# Patient Record
Sex: FEMALE | Race: WHITE
[De-identification: names, ages, dates, MRNs, and addresses within clinical notes are randomized per-mention and may not be internally consistent; named-entity substitution may affect disease eponyms.]

---

## 2019-01-23 ENCOUNTER — HOSPITAL ENCOUNTER (INPATIENT)
Dept: HOSPITAL 84 - D.ER | Age: 74
LOS: 2 days | Discharge: NURSING, REHAB OR LONG-TERM CARE | DRG: 638 | End: 2019-01-25
Attending: FAMILY MEDICINE | Admitting: FAMILY MEDICINE
Payer: MEDICARE

## 2019-01-23 VITALS
WEIGHT: 190 LBS | BODY MASS INDEX: 28.14 KG/M2 | WEIGHT: 190 LBS | HEIGHT: 69 IN | BODY MASS INDEX: 28.14 KG/M2 | HEIGHT: 69 IN | BODY MASS INDEX: 28.14 KG/M2

## 2019-01-23 DIAGNOSIS — F41.9: ICD-10-CM

## 2019-01-23 DIAGNOSIS — E11.65: Primary | ICD-10-CM
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
## 2019-01-23 DIAGNOSIS — M17.0: ICD-10-CM

## 2019-01-23 DIAGNOSIS — I10: ICD-10-CM

## 2019-01-23 DIAGNOSIS — N39.0: ICD-10-CM

## 2019-01-23 LAB
ALBUMIN SERPL-MCNC: 3.1 G/DL (ref 3.4–5)
ALP SERPL-CCNC: 93 U/L (ref 46–116)
ALT SERPL-CCNC: 41 U/L (ref 10–68)
ANION GAP SERPL CALC-SCNC: 15 MMOL/L (ref 8–16)
APPEARANCE UR: CLEAR
BACTERIA #/AREA URNS HPF: (no result) /HPF
BASOPHILS NFR BLD AUTO: 0.3 % (ref 0–2)
BILIRUB SERPL-MCNC: 0.35 MG/DL (ref 0.2–1.3)
BILIRUB SERPL-MCNC: NEGATIVE MG/DL
BUN SERPL-MCNC: 29 MG/DL (ref 7–18)
CALCIUM SERPL-MCNC: 8.8 MG/DL (ref 8.5–10.1)
CHLORIDE SERPL-SCNC: 97 MMOL/L (ref 98–107)
CO2 SERPL-SCNC: 23.9 MMOL/L (ref 21–32)
COLOR UR: YELLOW
CREAT SERPL-MCNC: 0.8 MG/DL (ref 0.6–1.3)
EOSINOPHIL NFR BLD: 0.3 % (ref 0–7)
ERYTHROCYTE [DISTWIDTH] IN BLOOD BY AUTOMATED COUNT: 12.4 % (ref 11.5–14.5)
GLOBULIN SER-MCNC: 3.3 G/L
GLUCOSE SERPL-MCNC: 1000 MG/DL
GLUCOSE SERPL-MCNC: 351 MG/DL (ref 74–106)
HCT VFR BLD CALC: 45.2 % (ref 36–48)
HGB BLD-MCNC: 15.7 G/DL (ref 12–16)
IMM GRANULOCYTES NFR BLD: 0.3 % (ref 0–5)
KETONES UR STRIP-MCNC: NEGATIVE MG/DL
LYMPHOCYTES NFR BLD AUTO: 12.6 % (ref 15–50)
MCH RBC QN AUTO: 30.3 PG (ref 26–34)
MCHC RBC AUTO-ENTMCNC: 34.7 G/DL (ref 31–37)
MCV RBC: 87.1 FL (ref 80–100)
MONOCYTES NFR BLD: 7.2 % (ref 2–11)
NEUTROPHILS NFR BLD AUTO: 79.3 % (ref 40–80)
NITRITE UR-MCNC: NEGATIVE MG/ML
OSMOLALITY SERPL CALC.SUM OF ELEC: 282 MOSM/KG (ref 275–300)
PH UR STRIP: 5 [PH] (ref 5–6)
PLATELET # BLD: 250 10X3/UL (ref 130–400)
PMV BLD AUTO: 10.2 FL (ref 7.4–10.4)
POTASSIUM SERPL-SCNC: 4.9 MMOL/L (ref 3.5–5.1)
PROT SERPL-MCNC: 6.4 G/DL (ref 6.4–8.2)
PROT UR-MCNC: (no result) MG/DL
RBC # BLD AUTO: 5.19 10X6/UL (ref 4–5.4)
RBC #/AREA URNS HPF: (no result) /HPF (ref 0–5)
SODIUM SERPL-SCNC: 131 MMOL/L (ref 136–145)
SP GR UR STRIP: 1.01 (ref 1–1.02)
SQUAMOUS #/AREA URNS HPF: (no result) /HPF (ref 0–5)
UROBILINOGEN UR-MCNC: NORMAL MG/DL
WBC # BLD AUTO: 15 10X3/UL (ref 4.8–10.8)

## 2019-01-23 NOTE — NUR
PATIENT RECEIVED FROM ED.  BED IN LOW POSITION WITH SIDERAILS X1 AND CALL
LIGHT WITHIN REACH.  THE PATIENT WAS EDUCATE DON THE USE OF A CALL LIGHT AND
DEMONSTRATED UNDERSTANDING VIA TEACHBACK METHOD.  THE PATIENT APPEARS
COMFORTABLE WITH NO QUESTIONS OR CONCERNS AT THIS TIME.

## 2019-01-23 NOTE — NUR
REPORT CALLED TO YUE COLEMAN ON MED 3. PATIENT TRANSFERED TO ROOM 1212 VIA
SHEELCHAIR IN STABLE CONDITION.

## 2019-01-24 VITALS — DIASTOLIC BLOOD PRESSURE: 81 MMHG | SYSTOLIC BLOOD PRESSURE: 144 MMHG

## 2019-01-24 VITALS — DIASTOLIC BLOOD PRESSURE: 91 MMHG | SYSTOLIC BLOOD PRESSURE: 181 MMHG

## 2019-01-24 VITALS — DIASTOLIC BLOOD PRESSURE: 88 MMHG | SYSTOLIC BLOOD PRESSURE: 187 MMHG

## 2019-01-24 VITALS — DIASTOLIC BLOOD PRESSURE: 85 MMHG | SYSTOLIC BLOOD PRESSURE: 178 MMHG

## 2019-01-24 VITALS — SYSTOLIC BLOOD PRESSURE: 161 MMHG | DIASTOLIC BLOOD PRESSURE: 102 MMHG

## 2019-01-24 VITALS — SYSTOLIC BLOOD PRESSURE: 176 MMHG | DIASTOLIC BLOOD PRESSURE: 111 MMHG

## 2019-01-24 LAB
ANION GAP SERPL CALC-SCNC: 13.3 MMOL/L (ref 8–16)
BASOPHILS NFR BLD AUTO: 0.2 % (ref 0–2)
BUN SERPL-MCNC: 22 MG/DL (ref 7–18)
CALCIUM SERPL-MCNC: 8.6 MG/DL (ref 8.5–10.1)
CHLORIDE SERPL-SCNC: 100 MMOL/L (ref 98–107)
CO2 SERPL-SCNC: 28.3 MMOL/L (ref 21–32)
CREAT SERPL-MCNC: 0.7 MG/DL (ref 0.6–1.3)
EOSINOPHIL NFR BLD: 0.5 % (ref 0–7)
ERYTHROCYTE [DISTWIDTH] IN BLOOD BY AUTOMATED COUNT: 12.4 % (ref 11.5–14.5)
GLUCOSE SERPL-MCNC: 186 MG/DL (ref 74–106)
HCT VFR BLD CALC: 43.4 % (ref 36–48)
HGB BLD-MCNC: 14.7 G/DL (ref 12–16)
IMM GRANULOCYTES NFR BLD: 0.2 % (ref 0–5)
LYMPHOCYTES NFR BLD AUTO: 17.3 % (ref 15–50)
MAGNESIUM SERPL-MCNC: 2 MG/DL (ref 1.8–2.4)
MCH RBC QN AUTO: 29.8 PG (ref 26–34)
MCHC RBC AUTO-ENTMCNC: 33.9 G/DL (ref 31–37)
MCV RBC: 87.9 FL (ref 80–100)
MONOCYTES NFR BLD: 7.4 % (ref 2–11)
NEUTROPHILS NFR BLD AUTO: 74.4 % (ref 40–80)
OSMOLALITY SERPL CALC.SUM OF ELEC: 281 MOSM/KG (ref 275–300)
PHOSPHATE SERPL-MCNC: 3.8 MG/DL (ref 2.5–4.9)
PLATELET # BLD: 239 10X3/UL (ref 130–400)
PMV BLD AUTO: 10 FL (ref 7.4–10.4)
POTASSIUM SERPL-SCNC: 4.6 MMOL/L (ref 3.5–5.1)
RBC # BLD AUTO: 4.94 10X6/UL (ref 4–5.4)
SODIUM SERPL-SCNC: 137 MMOL/L (ref 136–145)
WBC # BLD AUTO: 13 10X3/UL (ref 4.8–10.8)

## 2019-01-25 ENCOUNTER — HOSPITAL ENCOUNTER (INPATIENT)
Dept: HOSPITAL 84 - D.REHAB | Age: 74
LOS: 8 days | Discharge: HOME HEALTH | DRG: 948 | End: 2019-02-02
Attending: FAMILY MEDICINE | Admitting: FAMILY MEDICINE
Payer: MEDICARE

## 2019-01-25 VITALS
BODY MASS INDEX: 28.44 KG/M2 | BODY MASS INDEX: 28.44 KG/M2 | BODY MASS INDEX: 28.44 KG/M2 | HEIGHT: 69 IN | WEIGHT: 192 LBS | WEIGHT: 192 LBS | HEIGHT: 69 IN

## 2019-01-25 VITALS — SYSTOLIC BLOOD PRESSURE: 161 MMHG | DIASTOLIC BLOOD PRESSURE: 96 MMHG

## 2019-01-25 VITALS — DIASTOLIC BLOOD PRESSURE: 97 MMHG | SYSTOLIC BLOOD PRESSURE: 178 MMHG

## 2019-01-25 VITALS — DIASTOLIC BLOOD PRESSURE: 72 MMHG | SYSTOLIC BLOOD PRESSURE: 167 MMHG

## 2019-01-25 VITALS — SYSTOLIC BLOOD PRESSURE: 178 MMHG | DIASTOLIC BLOOD PRESSURE: 97 MMHG

## 2019-01-25 VITALS — DIASTOLIC BLOOD PRESSURE: 98 MMHG | SYSTOLIC BLOOD PRESSURE: 195 MMHG

## 2019-01-25 VITALS — SYSTOLIC BLOOD PRESSURE: 176 MMHG | DIASTOLIC BLOOD PRESSURE: 81 MMHG

## 2019-01-25 DIAGNOSIS — R52: ICD-10-CM

## 2019-01-25 DIAGNOSIS — N39.0: ICD-10-CM

## 2019-01-25 DIAGNOSIS — E11.40: ICD-10-CM

## 2019-01-25 DIAGNOSIS — R53.1: ICD-10-CM

## 2019-01-25 DIAGNOSIS — E11.65: ICD-10-CM

## 2019-01-25 DIAGNOSIS — R41.0: ICD-10-CM

## 2019-01-25 DIAGNOSIS — F41.9: ICD-10-CM

## 2019-01-25 DIAGNOSIS — M17.0: ICD-10-CM

## 2019-01-25 DIAGNOSIS — R53.81: Primary | ICD-10-CM

## 2019-01-25 LAB
ANION GAP SERPL CALC-SCNC: 14.7 MMOL/L (ref 8–16)
BASOPHILS NFR BLD AUTO: 0.4 % (ref 0–2)
BUN SERPL-MCNC: 19 MG/DL (ref 7–18)
CALCIUM SERPL-MCNC: 8.9 MG/DL (ref 8.5–10.1)
CHLORIDE SERPL-SCNC: 99 MMOL/L (ref 98–107)
CK MB SERPL-MCNC: 1.8 U/L (ref 0–3.6)
CK SERPL-CCNC: 87 UL (ref 21–215)
CO2 SERPL-SCNC: 24.3 MMOL/L (ref 21–32)
CREAT SERPL-MCNC: 0.6 MG/DL (ref 0.6–1.3)
EOSINOPHIL NFR BLD: 0.5 % (ref 0–7)
ERYTHROCYTE [DISTWIDTH] IN BLOOD BY AUTOMATED COUNT: 12.4 % (ref 11.5–14.5)
GLUCOSE SERPL-MCNC: 207 MG/DL (ref 74–106)
HCT VFR BLD CALC: 44.7 % (ref 36–48)
HGB BLD-MCNC: 15.4 G/DL (ref 12–16)
IMM GRANULOCYTES NFR BLD: 0.3 % (ref 0–5)
LYMPHOCYTES NFR BLD AUTO: 19.4 % (ref 15–50)
MCH RBC QN AUTO: 30.4 PG (ref 26–34)
MCHC RBC AUTO-ENTMCNC: 34.5 G/DL (ref 31–37)
MCV RBC: 88.2 FL (ref 80–100)
MONOCYTES NFR BLD: 7.2 % (ref 2–11)
NEUTROPHILS NFR BLD AUTO: 72.2 % (ref 40–80)
OSMOLALITY SERPL CALC.SUM OF ELEC: 275 MOSM/KG (ref 275–300)
PLATELET # BLD: 266 10X3/UL (ref 130–400)
PMV BLD AUTO: 10.2 FL (ref 7.4–10.4)
POTASSIUM SERPL-SCNC: 4 MMOL/L (ref 3.5–5.1)
RBC # BLD AUTO: 5.07 10X6/UL (ref 4–5.4)
SODIUM SERPL-SCNC: 134 MMOL/L (ref 136–145)
WBC # BLD AUTO: 15 10X3/UL (ref 4.8–10.8)

## 2019-01-25 NOTE — NUR
FSBS 313 PROVIDED PT WITH INSULIN PER SS. PT LYING BACK IN BED RESTING QUIETLY
DENIES ANY CURRENT PAIN OR NEEDS. CL IN REACH, BED IN LOWEST, SIDE RAILS X2.
WILL CTM.

## 2019-01-25 NOTE — MORECARE
CASE MANAGEMENT DISCHARGE SUMMARY
 
 
PATIENT: ROSALIE HERNANDEZ                      UNIT: [id number]
ACCOUNT#: [id number]                       ADM DATE: 19
AGE: 73     : [birth year]  SEX: F            ROOM/BED: D.1212    
AUTHOR: GABINO COLON                             PHYSICIAN:                               
 
REFERRING PHYSICIAN: KATHY CRUM MD               
DATE OF SERVICE: 19
Discharge Plan
 
 
Patient Name: ROSALIE HERNANDEZ
[facility]
Encounter #: [id number]
Medical Record #: [id number]
: 1945
Planned Disposition: 
Anticipated Discharge Date: 
 
Discharge Date: 
Expected LOS: 
Initial Reviewer: [id number]
Initial Review Date: 2019
Generated: 19  10:44 am 
Comments
 
DCP- Discharge Planning
 
Updated by [id number]: Fabiana Leonardo on 19   8:39 am CT
CM spoke with Ginger at [facility] IRF about order for Rehab Prescreening Consult. 
Ginger will see patient today. CM will continue to follow and assist as 
needed with discharge planning / needs.
  
 
 
 
 
 
 
 
Patient Name: ROSALIE HERNANDEZ
 
Encounter #: [id number]
Page [id number]
 
 
 
 
 
Electronically Signed by GABINO COLON on 19 at 0945
 
 
 
 
 
 
**All edits/amendments must be made on the electronic document**
 
DICTATION DATE: 19     : NAN  19     
RPT#: [id number]                                DC DATE:        
                                               STATUS: ADM IN  
[facility]
 [place] [address]
***END OF REPORT***

## 2019-01-25 NOTE — NUR
PT AMBULATED HERSELF TO BR BUT THEN CALLED NEEDING HELP BACK HOWEVER PT DOES
FAIRLY WELL ON HER OWN BUT WILL DEMAND ASSISTANCE AND SHE IS A FALL RISK BUT
DOING VERY GOOD. ASSISTED PT BACK INTO BED AND SHE VOICED THANKS AND IS
SITTING UP IN BED RESTING QUIETLY. DENIES ANY CURRENT PAIN OR NEEDS. CL IN
REACH, BED IN LOWEST, SIDE RAILS X2. WILL CTM.

## 2019-01-25 NOTE — NUR
Rehab Note- VIsited with the patient, she is in agreeance with [facility]patient REhab, will accept when medically stable and ready for discharge
from the acute hosptial. SPoke to JARED Roach. Thank you for this referral!
Latisha Muhammad RN
CLinical Liaison, [facility]

## 2019-01-25 NOTE — NUR
AM ROUNDS COMPLETED. INTRODUCED MYSELF TO PT AS PRIMARY RN FOR TODAYS SHIFT.
PT IS A&O SITTING UP IN BED RESTING QUIETLY. SHIFT ASSESSMENT COMPLETED. PT
DENIES ANY CURRENT PAIN OR NEEDS. CL IN REACH.  AT BEDSIDE ROUNDING.
WILL CTM.

## 2019-01-25 NOTE — NUR
REHAB CAME BY AND WILL ACCEPT FOR INPATIENT REHAB. I EXPLAINED TO HER AND SHE
IS VERY EXCITED. NO CURRENT NEEDS. WILL CTM.

## 2019-01-25 NOTE — NUR
Rehab Note- Acute Inpatient Rehab prescreen order received. Reviewed medical
record. Will visit with the patient about possible inpatient acute rehab stay.
Spoke with ASHLEY Angeles. Thank you for this referral!
Latisha Muhammad RN
Clinical Liaison, [facility]

## 2019-01-25 NOTE — NUR
DISCHARGE TEACHING PROVIDED AND PAPERS SIGNED. PT VERBALIZED UNDERSTANDING AND
DENIES ANY QUESTIONS OR CONCERNS. D/C PTS L.FA PIV WITH CATHETER TIP FULLY
INTACT. ALL BELONGINGS COLLECTED AND CALLED SHIFT REPORT TO BAM[facility] DORMAN IN
INPATIENT REHAB. WILL HAVE PT ESCORTED THERE NOW. NO FURTHER NEEDS.

## 2019-01-26 VITALS — DIASTOLIC BLOOD PRESSURE: 76 MMHG | SYSTOLIC BLOOD PRESSURE: 164 MMHG

## 2019-01-26 VITALS — DIASTOLIC BLOOD PRESSURE: 92 MMHG | SYSTOLIC BLOOD PRESSURE: 114 MMHG

## 2019-01-26 LAB
ANION GAP SERPL CALC-SCNC: 11.7 MMOL/L (ref 8–16)
BASOPHILS NFR BLD AUTO: 0.3 % (ref 0–2)
BUN SERPL-MCNC: 18 MG/DL (ref 7–18)
CALCIUM SERPL-MCNC: 8.4 MG/DL (ref 8.5–10.1)
CHLORIDE SERPL-SCNC: 97 MMOL/L (ref 98–107)
CO2 SERPL-SCNC: 25.2 MMOL/L (ref 21–32)
CREAT SERPL-MCNC: 0.7 MG/DL (ref 0.6–1.3)
EOSINOPHIL NFR BLD: 0.4 % (ref 0–7)
ERYTHROCYTE [DISTWIDTH] IN BLOOD BY AUTOMATED COUNT: 12.3 % (ref 11.5–14.5)
GLUCOSE SERPL-MCNC: 207 MG/DL (ref 74–106)
HCT VFR BLD CALC: 40.1 % (ref 36–48)
HGB BLD-MCNC: 13.9 G/DL (ref 12–16)
IMM GRANULOCYTES NFR BLD: 0.3 % (ref 0–5)
LYMPHOCYTES NFR BLD AUTO: 18.4 % (ref 15–50)
MCH RBC QN AUTO: 30 PG (ref 26–34)
MCHC RBC AUTO-ENTMCNC: 34.7 G/DL (ref 31–37)
MCV RBC: 86.6 FL (ref 80–100)
MONOCYTES NFR BLD: 7.9 % (ref 2–11)
NEUTROPHILS NFR BLD AUTO: 72.7 % (ref 40–80)
OSMOLALITY SERPL CALC.SUM OF ELEC: 268 MOSM/KG (ref 275–300)
PLATELET # BLD: 222 10X3/UL (ref 130–400)
PMV BLD AUTO: 9.5 FL (ref 7.4–10.4)
POTASSIUM SERPL-SCNC: 3.9 MMOL/L (ref 3.5–5.1)
RBC # BLD AUTO: 4.63 10X6/UL (ref 4–5.4)
SODIUM SERPL-SCNC: 130 MMOL/L (ref 136–145)
WBC # BLD AUTO: 11.6 10X3/UL (ref 4.8–10.8)

## 2019-01-27 VITALS — DIASTOLIC BLOOD PRESSURE: 91 MMHG | SYSTOLIC BLOOD PRESSURE: 195 MMHG

## 2019-01-27 VITALS — SYSTOLIC BLOOD PRESSURE: 146 MMHG | DIASTOLIC BLOOD PRESSURE: 70 MMHG

## 2019-01-27 NOTE — MORECARE
CASE MANAGEMENT DISCHARGE SUMMARY
 
 
PATIENT: ROSALIE HERNANDEZ                      UNIT: [id number]
ACCOUNT#: [id number]                       ADM DATE: 19
AGE: 73     : [birth year]  SEX: F            ROOM/BED: D.1212    
AUTHOR: GABINO COLON                             PHYSICIAN:                               
 
REFERRING PHYSICIAN: KATHY CRUM MD               
DATE OF SERVICE: 19
Discharge Plan
 
 
Patient Name: ROSALIE HERNANDEZ
[facility]
Encounter #: [id number]
Medical Record #: [id number]
: 1945
Planned Disposition: 
Anticipated Discharge Date: 
 
Discharge Date: 2019
Expected LOS: 
Initial Reviewer: [email or web address]
Initial Review Date: 2019
Generated: 19   4:26 pm 
Comments
 
DCP- Discharge Planning
 
Updated by [email or web address]: Fabiana Leonardo on 19   8:39 am CT
CM spoke with Ginger at [facility] IRF about order for Rehab Prescreening Consult. 
Ginger will see patient today. CM will continue to follow and assist as 
needed with discharge planning / needs.
  
 
 
 
 
 
 
 
 
Last DP export: 19   8:45 a
Patient Name: ROSALIE HERNANDEZ
 
Encounter #: [id number]
Page [id number]
 
 
 
 
 
Electronically Signed by GABINO COLON on 19 at 1527
 
 
 
 
 
 
**All edits/amendments must be made on the electronic document**
 
DICTATION DATE: 19     : NAN  19     
RPT#: [id number]                                DC DATE:19
                                               STATUS: DIS IN  
[facility]
[address] [place] [address]
***END OF REPORT***

## 2019-01-28 VITALS — SYSTOLIC BLOOD PRESSURE: 182 MMHG | DIASTOLIC BLOOD PRESSURE: 88 MMHG

## 2019-01-28 VITALS — DIASTOLIC BLOOD PRESSURE: 83 MMHG | SYSTOLIC BLOOD PRESSURE: 185 MMHG

## 2019-01-28 LAB
ANION GAP SERPL CALC-SCNC: 12.6 MMOL/L (ref 8–16)
BASOPHILS NFR BLD AUTO: 0.3 % (ref 0–2)
BUN SERPL-MCNC: 23 MG/DL (ref 7–18)
CALCIUM SERPL-MCNC: 8.4 MG/DL (ref 8.5–10.1)
CHLORIDE SERPL-SCNC: 101 MMOL/L (ref 98–107)
CO2 SERPL-SCNC: 26.3 MMOL/L (ref 21–32)
CREAT SERPL-MCNC: 0.6 MG/DL (ref 0.6–1.3)
EOSINOPHIL NFR BLD: 0.8 % (ref 0–7)
ERYTHROCYTE [DISTWIDTH] IN BLOOD BY AUTOMATED COUNT: 12.6 % (ref 11.5–14.5)
GLUCOSE SERPL-MCNC: 220 MG/DL (ref 74–106)
HCT VFR BLD CALC: 40.8 % (ref 36–48)
HGB BLD-MCNC: 13.9 G/DL (ref 12–16)
IMM GRANULOCYTES NFR BLD: 0.2 % (ref 0–5)
LYMPHOCYTES NFR BLD AUTO: 19.3 % (ref 15–50)
MCH RBC QN AUTO: 29.8 PG (ref 26–34)
MCHC RBC AUTO-ENTMCNC: 34.1 G/DL (ref 31–37)
MCV RBC: 87.4 FL (ref 80–100)
MONOCYTES NFR BLD: 9.2 % (ref 2–11)
NEUTROPHILS NFR BLD AUTO: 70.2 % (ref 40–80)
OSMOLALITY SERPL CALC.SUM OF ELEC: 282 MOSM/KG (ref 275–300)
PLATELET # BLD: 216 10X3/UL (ref 130–400)
PMV BLD AUTO: 10.2 FL (ref 7.4–10.4)
POTASSIUM SERPL-SCNC: 3.9 MMOL/L (ref 3.5–5.1)
RBC # BLD AUTO: 4.67 10X6/UL (ref 4–5.4)
SODIUM SERPL-SCNC: 136 MMOL/L (ref 136–145)
WBC # BLD AUTO: 9.9 10X3/UL (ref 4.8–10.8)

## 2019-01-29 VITALS — SYSTOLIC BLOOD PRESSURE: 175 MMHG | DIASTOLIC BLOOD PRESSURE: 76 MMHG

## 2019-01-29 VITALS — DIASTOLIC BLOOD PRESSURE: 67 MMHG | SYSTOLIC BLOOD PRESSURE: 143 MMHG

## 2019-01-30 VITALS — DIASTOLIC BLOOD PRESSURE: 78 MMHG | SYSTOLIC BLOOD PRESSURE: 172 MMHG

## 2019-01-30 VITALS — DIASTOLIC BLOOD PRESSURE: 79 MMHG | SYSTOLIC BLOOD PRESSURE: 145 MMHG

## 2019-01-30 LAB
ANION GAP SERPL CALC-SCNC: 10.8 MMOL/L (ref 8–16)
BASOPHILS NFR BLD AUTO: 0.3 % (ref 0–2)
BUN SERPL-MCNC: 18 MG/DL (ref 7–18)
CALCIUM SERPL-MCNC: 8.5 MG/DL (ref 8.5–10.1)
CHLORIDE SERPL-SCNC: 101 MMOL/L (ref 98–107)
CO2 SERPL-SCNC: 28.4 MMOL/L (ref 21–32)
CREAT SERPL-MCNC: 0.7 MG/DL (ref 0.6–1.3)
EOSINOPHIL NFR BLD: 0.7 % (ref 0–7)
ERYTHROCYTE [DISTWIDTH] IN BLOOD BY AUTOMATED COUNT: 12.5 % (ref 11.5–14.5)
GLUCOSE SERPL-MCNC: 175 MG/DL (ref 74–106)
HCT VFR BLD CALC: 41.4 % (ref 36–48)
HGB BLD-MCNC: 14.1 G/DL (ref 12–16)
IMM GRANULOCYTES NFR BLD: 0.3 % (ref 0–5)
LYMPHOCYTES NFR BLD AUTO: 18.7 % (ref 15–50)
MCH RBC QN AUTO: 30.1 PG (ref 26–34)
MCHC RBC AUTO-ENTMCNC: 34.1 G/DL (ref 31–37)
MCV RBC: 88.5 FL (ref 80–100)
MONOCYTES NFR BLD: 9.6 % (ref 2–11)
NEUTROPHILS NFR BLD AUTO: 70.4 % (ref 40–80)
OSMOLALITY SERPL CALC.SUM OF ELEC: 277 MOSM/KG (ref 275–300)
PLATELET # BLD: 226 10X3/UL (ref 130–400)
PMV BLD AUTO: 10 FL (ref 7.4–10.4)
POTASSIUM SERPL-SCNC: 4.2 MMOL/L (ref 3.5–5.1)
RBC # BLD AUTO: 4.68 10X6/UL (ref 4–5.4)
SODIUM SERPL-SCNC: 136 MMOL/L (ref 136–145)
WBC # BLD AUTO: 11.5 10X3/UL (ref 4.8–10.8)

## 2019-01-31 VITALS — SYSTOLIC BLOOD PRESSURE: 174 MMHG | DIASTOLIC BLOOD PRESSURE: 80 MMHG

## 2019-01-31 VITALS — DIASTOLIC BLOOD PRESSURE: 88 MMHG | SYSTOLIC BLOOD PRESSURE: 190 MMHG

## 2019-01-31 NOTE — RHP
PATIENT: ROSALIE HERNANDEZ                                  MEDICAL RECORD: [id number]
ACCOUNT: [id number]                                    LOCATION:[facility]
: [birth year]                                            ADMISSION DATE: 19
                                                         
 
                     REHABILITATION HISTORY AND PHYSICAL EXAMINATION
                         POST ADMISSION PHYSICIAN EXAMINATION
 
 
DATE OF ADMISSION:  2019
 
ADMITTING DIAGNOSES:  Debility secondary to urinary tract infection.
 
HISTORY OF PRESENT ILLNESS:  The patient admitted to inpatient rehab for
debility secondary to urinary tract infection.  She is a 73-year-old female
patient who has poorly controlled diabetes presented to the ED with 4 to 5 day
history of profound weakness and confusion.  She had fallen in her house 4 days
prior to admit.  She did not get herself around.  She has severe osteoarthritis
in her knees, cannot get knee replacement until her A1c is under control.  She
has gotten progressively weaker and had increased confusion to the point of
being unable to ambulate in her house or care for herself.  She was found to
have a UTI, receiving IV therapy for this.  She lives alone and been using a
single point cane for mobility was independent with self-care except for using a
lawn chair for showering.  She states that she began to fall off and recently
fell and bruised her right bicep to the point that she had been unable to start
her car to drive herself around and she has been depending on her friend to
drive around.  She is currently receiving IV therapy.  She is a high risk for
falls, had profound weakness.  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
difficulty transferring and ambulation.  She has been dizzy at times, impaired
mobility, hyperglycemia and self-care deficit.  These are all barriers to
discharge her home safely at this time.  Currently, set up for mod assist for
ADLs, mod assist for mobility and high risk of falls.  She plans to return home
with her family at her prior level of functioning and hopefully get better after
acute inpatient stay.
 
COMORBIDITIES:  Include diabetes with hyperglycemia, anxiety, pain, self-care
deficit debility, impaired mobility, weakness, neuropathy, osteoarthritis, and
UTI.
 
PAST MEDICAL HISTORY:  Significant for diabetes, hypertension, arthritis,
neuropathy.
 
PAST SURGICAL HISTORY:  Includes hysterectomy.
 
ALLERGIES:  No known drug allergies.
 
CURRENT MEDICATIONS:  Include metoprolol 200 mg daily, Synthroid 200 mcg daily,
Prozac 40 mg daily, Librium 10 mg t.i.d., polyethylene glycol 17 grams in 8
ounces of water daily, tramadol 50 mg every 6 hours p.r.n., Altace 10 mg b.i.d. 
She is on regular insulin with intermediate resistant sliding scale, Levaquin
500 mg daily, and Celebrex 100 mg b.i.d.
 
HABITS:  No alcohol or tobacco use.
 
FAMILY HISTORY:  Noncontributory.
 
SOCIAL HISTORY:  The patient hopes to return back home and get back to her prior
level of functioning.
 
 
 
HISTORY AND PHYSICAL                           [id number]    ROSALIE HERNANDEZ          
 
 
 
REVIEW OF SYSTEMS:
GENERAL:  Does complain of weakness and fatigue.
HEENT:  Denies cold, cough, or congestion.
CARDIOVASCULAR:  Denies chest pain.
 
PHYSICAL EXAMINATION:
VITAL SIGNS:  Stable, afebrile.
GENERAL:  A somewhat obese female in no acute distress, alert upon exam.
HEENT:  Normocephalic and atraumatic.  Mucosa moist.
NECK:  Supple.  No lymphadenopathy.
LUNGS:  Clear at this time.
HEART:  Regular rate and rhythm.  No murmurs, rubs or gallops.
ABDOMEN:  Benign.
EXTREMITIES:  I can see some previous bruising from previous falls.
NEUROLOGIC:  She does have noted proximal muscle weakness.
 
LABORATORY DATA:  White count is 11.6, H&H of 13 and 40, and platelet count is
noted to be 222.  Her sodium is 130, potassium 3.9, BUN and creatinine of 18 and
0.7, and blood sugar is noted to be 207.
 
ASSESSMENT:  This is a 73-year-old female patient admitted to rehab with a
working diagnosis of debility secondary to frequent falls and urinary tract
infection.  The patient has potential to make improvement.  We instituted the
following multidisciplinary therapies including, but not limited to physical,
occupational, respiratory, speech, nutritional services, prosthetics and
orthotics.  Given her complex medical condition and risk for more complications,
rehabilitation services cannot be provided at a low level of care such as
skilled nursing facility.
 
PLAN:
1.  Admit to [facility] for intensive inpatient therapy to
include the following disciplines:
A.  Physical therapy to improve gait, all transfer skills and bed mobility to a
modified independent level.
B.  Occupational therapy to a modified independent level.
C.  Case management to assist with discharge planning and placement options.
D.  Nutrition to assist with nutritional needs.
E.  Rehabilitation nursing to assist in monitoring the patient's underlying
medical condition and to assist with any type of bowel or bladder management.
2.  The patient's current medication and medical care will be continued.
3.  The patient will be placed on standard fall precautions.
4.  The patient's estimated length of stay is approximately 7-10 days.
5.  We will follow her closely and we will see again in the a.m. on  or
Monday.
 
TRANSINT:[id number] Voice Confirmation ID: [id number] DOCUMENT ID: [id number]
2019 Edited for [address]. 
 
 
ARIANA notes whether there has been none or any medical/functional
change since admission:
- No change since preadmission screen.                                  
 
 
 
 
 
HISTORY AND PHYSICAL                           [id number]    ROSALIE HERNANDEZ attests patient continues to be appropriate for IRF:
- Continues to be appropriate.                                          
 
 
                                           
                                           ANA ROSA FARRELL MD               
 
 
 
Electronically Signed by ANA ROSA FARRELL on 19 at 0830
 
 
 
 
 
 
 
 
 
 
 
 
 
 
 
 
 
 
 
 
 
 
 
 
 
 
 
 
 
 
 
 
 
 
 
 
 
CC:                                                             [id number]
DICTATION DATE: 19 1205     :     19 1353      ADM IN  
                                                                              
[address] [place] [address]

## 2019-02-01 VITALS — SYSTOLIC BLOOD PRESSURE: 187 MMHG | DIASTOLIC BLOOD PRESSURE: 84 MMHG

## 2019-02-01 LAB
ANION GAP SERPL CALC-SCNC: 11.4 MMOL/L (ref 8–16)
ANISOCYTOSIS BLD QL SMEAR: (no result)
BUN SERPL-MCNC: 22 MG/DL (ref 7–18)
CALCIUM SERPL-MCNC: 8.5 MG/DL (ref 8.5–10.1)
CHLORIDE SERPL-SCNC: 101 MMOL/L (ref 98–107)
CO2 SERPL-SCNC: 27.8 MMOL/L (ref 21–32)
CREAT SERPL-MCNC: 0.6 MG/DL (ref 0.6–1.3)
ERYTHROCYTE [DISTWIDTH] IN BLOOD BY AUTOMATED COUNT: 12.6 % (ref 11.5–14.5)
GLUCOSE SERPL-MCNC: 242 MG/DL (ref 74–106)
HCT VFR BLD CALC: 39.1 % (ref 36–48)
HGB BLD-MCNC: 13.3 G/DL (ref 12–16)
LYMPHOCYTES NFR BLD AUTO: 19 % (ref 15–50)
MCH RBC QN AUTO: 29.8 PG (ref 26–34)
MCHC RBC AUTO-ENTMCNC: 34 G/DL (ref 31–37)
MCV RBC: 87.5 FL (ref 80–100)
MONOCYTES NFR BLD: 11 % (ref 2–11)
NEUTROPHILS NFR BLD AUTO: 68 % (ref 40–80)
OSMOLALITY SERPL CALC.SUM OF ELEC: 282 MOSM/KG (ref 275–300)
PLATELET # BLD EST: NORMAL 10*3/UL
PLATELET # BLD: 212 10X3/UL (ref 130–400)
PMV BLD AUTO: 10.3 FL (ref 7.4–10.4)
POTASSIUM SERPL-SCNC: 4.2 MMOL/L (ref 3.5–5.1)
RBC # BLD AUTO: 4.47 10X6/UL (ref 4–5.4)
SODIUM SERPL-SCNC: 136 MMOL/L (ref 136–145)
WBC # BLD AUTO: 10 10X3/UL (ref 4.8–10.8)

## 2019-02-02 VITALS — SYSTOLIC BLOOD PRESSURE: 147 MMHG | DIASTOLIC BLOOD PRESSURE: 75 MMHG

## 2019-02-10 ENCOUNTER — HOSPITAL ENCOUNTER (INPATIENT)
Dept: HOSPITAL 84 - D.ER | Age: 74
LOS: 6 days | Discharge: HOME | DRG: 881 | End: 2019-02-16
Attending: PSYCHIATRY & NEUROLOGY | Admitting: PSYCHIATRY & NEUROLOGY
Payer: MEDICARE

## 2019-02-10 VITALS
HEIGHT: 69 IN | BODY MASS INDEX: 28.05 KG/M2 | WEIGHT: 189.4 LBS | HEIGHT: 69 IN | BODY MASS INDEX: 28.05 KG/M2 | WEIGHT: 189.4 LBS | BODY MASS INDEX: 28.05 KG/M2 | BODY MASS INDEX: 28.05 KG/M2

## 2019-02-10 DIAGNOSIS — I10: ICD-10-CM

## 2019-02-10 DIAGNOSIS — N39.0: ICD-10-CM

## 2019-02-10 DIAGNOSIS — E55.9: ICD-10-CM

## 2019-02-10 DIAGNOSIS — E03.9: ICD-10-CM

## 2019-02-10 DIAGNOSIS — M17.0: ICD-10-CM

## 2019-02-10 DIAGNOSIS — E11.9: ICD-10-CM

## 2019-02-10 DIAGNOSIS — F32.9: Primary | ICD-10-CM

## 2019-02-10 DIAGNOSIS — R26.89: ICD-10-CM

## 2019-02-10 DIAGNOSIS — R19.7: ICD-10-CM

## 2019-02-10 DIAGNOSIS — F41.8: ICD-10-CM

## 2019-02-10 DIAGNOSIS — M47.819: ICD-10-CM

## 2019-02-10 DIAGNOSIS — R45.851: ICD-10-CM

## 2019-02-10 LAB
AMPHETAMINES UR QL SCN: NEGATIVE QUAL
ANION GAP SERPL CALC-SCNC: 12.1 MMOL/L (ref 8–16)
APAP SERPL-MCNC: 0 UG/ML (ref 10–30)
APPEARANCE UR: (no result)
BACTERIA #/AREA URNS HPF: (no result) /HPF
BARBITURATES UR QL SCN: NEGATIVE QUAL
BASOPHILS NFR BLD AUTO: 0.2 % (ref 0–2)
BENZODIAZ UR QL SCN: POSITIVE QUAL
BILIRUB SERPL-MCNC: NEGATIVE MG/DL
BUN SERPL-MCNC: 21 MG/DL (ref 7–18)
BZE UR QL SCN: NEGATIVE QUAL
CALCIUM SERPL-MCNC: 8.8 MG/DL (ref 8.5–10.1)
CANNABINOIDS UR QL SCN: NEGATIVE QUAL
CHLORIDE SERPL-SCNC: 99 MMOL/L (ref 98–107)
CO2 SERPL-SCNC: 28.1 MMOL/L (ref 21–32)
COLOR UR: YELLOW
CREAT SERPL-MCNC: 0.7 MG/DL (ref 0.6–1.3)
EOSINOPHIL NFR BLD: 0.7 % (ref 0–7)
ERYTHROCYTE [DISTWIDTH] IN BLOOD BY AUTOMATED COUNT: 12.6 % (ref 11.5–14.5)
ETHANOL SERPL-MCNC: 0 MG/DL (ref 0–10)
GLUCOSE SERPL-MCNC: 100 MG/DL
GLUCOSE SERPL-MCNC: 234 MG/DL (ref 74–106)
HCT VFR BLD CALC: 40.3 % (ref 36–48)
HGB BLD-MCNC: 13.9 G/DL (ref 12–16)
IMM GRANULOCYTES NFR BLD: 0.2 % (ref 0–5)
KETONES UR STRIP-MCNC: NEGATIVE MG/DL
LYMPHOCYTES NFR BLD AUTO: 18.4 % (ref 15–50)
MCH RBC QN AUTO: 29.8 PG (ref 26–34)
MCHC RBC AUTO-ENTMCNC: 34.5 G/DL (ref 31–37)
MCV RBC: 86.5 FL (ref 80–100)
MONOCYTES NFR BLD: 7.8 % (ref 2–11)
NEUTROPHILS NFR BLD AUTO: 72.7 % (ref 40–80)
NITRITE UR-MCNC: NEGATIVE MG/ML
OPIATES UR QL SCN: NEGATIVE QUAL
OSMOLALITY SERPL CALC.SUM OF ELEC: 280 MOSM/KG (ref 275–300)
PCP UR QL SCN: NEGATIVE QUAL
PH UR STRIP: 7 [PH] (ref 5–6)
PLATELET # BLD: 218 10X3/UL (ref 130–400)
PMV BLD AUTO: 9.6 FL (ref 7.4–10.4)
POTASSIUM SERPL-SCNC: 4.2 MMOL/L (ref 3.5–5.1)
PROT UR-MCNC: (no result) MG/DL
RBC # BLD AUTO: 4.66 10X6/UL (ref 4–5.4)
RBC #/AREA URNS HPF: (no result) /HPF (ref 0–5)
SODIUM SERPL-SCNC: 135 MMOL/L (ref 136–145)
SP GR UR STRIP: 1.01 (ref 1–1.02)
SQUAMOUS #/AREA URNS HPF: (no result) /HPF (ref 0–5)
TSH SERPL-ACNC: 0.55 UIU/ML (ref 0.36–3.74)
UROBILINOGEN UR-MCNC: 4 MG/DL
WBC # BLD AUTO: 10.3 10X3/UL (ref 4.8–10.8)

## 2019-02-10 NOTE — NUR
PT AMBULATED TO RESTROOM WITH ASSISTANCE AND WITH THE USE OF A WALKER. PT
APPEARED STEADY ON FEET. URINE SAMPLE TAKEN TO LAB.

## 2019-02-10 NOTE — HP
PATIENT: ROSALIE URIOSTEGUI                                  MEDICAL RECORD: [id number]
ACCOUNT: [id number]                                    LOCATION:[facility]
: [birth year]                                            ADMISSION DATE: 19
                                                         PCP: No PCP                 
 
                             HISTORY AND PHYSICAL EXAMINATION
 
 
HISTORY OF PRESENT ILLNESS:  Ms. Uriostegui is a 73-year-old female who had
reported to her home health agency that she was having thoughts of harming
herself.  She was brought to the ER by ambulance.  There, she reiterated this
stating that she was simply tired of living, feeling isolative, withdrawn, flat,
and hopeless.  She had stated that she had no specific plan to harm herself, but
"if I had a button to push so that I did not have to live anymore, I would push
it."  She also reported that although she had a history of depression, she "I
have never had depression like this before."  She denies homicidal ideation,
auditory or visual hallucinations.  She states that her problems have been
getting progressively worse.  She has limited ambulation capacity here.  She
insisted on a wheelchair and the ER reports states she is at home with a walker,
but she states she has lost several friends recently, cannot drive and is
totally dependent upon friends and home health.  She recently went for a rehab
stay while free and cleaned her house and apparently due to the multiple cats in
her house, the friend got rid of multiple items of hers in the cleaning.  She
states that since she has been home; therefore, she cannot take a bath because
she got rid of her bath chair.  There are several other items and she just felt
increasingly overwhelmed to the point she states she simply wanted to escape. 
Today, she minimizes any active suicidal ideation.
 
PAST PSYCHIATRIC HISTORY:  She states she has had a long history of counseling
in the past and has been on Prozac for a long time of several months ago, Prozac
was increased to 40, but she states she does not see benefit.  She also reports
recent increase in confusion when having an UTI, but is now treated.  She denies
history of active self-harm, any past psychiatric admissions.
 
PAST MEDICAL HISTORY:  Positive for hypothyroidism, insulin-dependent diabetes,
arthritis, back pain with she reports several recent falls.
 
HOME MEDICATIONS LIST:  As got from her doctor is tramadol one p.o. q.6 hours
p.r.n., Librium 1 p.o. t.i.d. p.r.n., KwikPen insulin at 100 units per mL
subcutaneous 30 units nightly, celecoxib 2 tablets daily, levothyroxine 125 mcg
daily, metoprolol 200 mg daily, mupirocin 2% topical ointment, ramipril 10 mg
b.i.d., fluoxetine 1 p.o. every day.
 
DRUG AND ALCOHOL:  On the ER report, she mentions the use of 50-year use of wine
and use of marijuana.  On initial nursing assessment, she denies use of any of
these at all.  She also reported that her Librium use is started by taking her
mother's.  During my evaluation, she minimized this and hurriedly moved on to
other statements so it is unclear about her present use.
 
SOCIAL HISTORY:  She was an occupational therapist, which she is quick to say
during interview and to other staff.  She lived in [place], then [place] and then
has been here since she retired 15 years ago.  She states that she has limited
social support as she has 2 nieces that are in [place] and that her closest
friends have lately  or been diagnosed with dementia.  She has home health
at home, but is considering selling her house and moving to assisted living. 
She denies any legal problems.
 
FAMILY HISTORY:  Unknown.
 
 
 
HISTORY AND PHYSICAL                           [id number]    ROSALIE URIOSTEGUI          
 
 
 
MENTAL STATUS EXAMINATION:  This is a 73-year-old ill-kempt  female in
a wheelchair.  She is fairly demanding throughout, controlling of the interview,
which is consistent with nursing report.  Her speech is regular rate and rhythm.
 Her mood is irritable with affect primarily congruent.  Thought process is
rational, and goal directed.  Thought content:  She denies active SI and reports
passive SI.  No homicidal ideation on her.  No auditory or visual
hallucinations.  No delusions.  Cognitive exam:  She was alert and oriented
times 3.
 
ASSESSMENT:  MDE with anxious features, rule out mood disorder secondary to UTI,
rule out recent mild cognitive impairment.  Cluster B personality traits.
 
PLAN:  We will discontinue Prozac and start Cymbalta for its help with pain and
depressive symptomatology.  Discussed potential interactions, particularly with
tramadol and metoprolol.  The patient voiced understanding and agreement.  I
have advised the patient and  to talk to each other to help the
patient with her options as far as other housing.  We will get Dr. Petersen to
supervise the patient medically.  Case discussed with nursing, chart reviewed,
and the patient interviewed.
 
TRANSINT:[id number] Voice Confirmation ID: [id number] DOCUMENT ID: [id number]
 
 
                                           
                                           KEMI CHARLES MD           
 
 
 
 
 
 
 
 
 
 
 
 
 
 
 
 
 
 
 
 
 
CC:                                                             [id number]
DICTATION DATE: 19 1137     :     19 1203      ADM IN  
                                                                              
[id number] [place] [address]

## 2019-02-10 NOTE — NUR
IN/OUT CATH PERFORMED ON PT. PT ASSISTED TO RESTROOM WITH USE OF WALKER. PT
PLACED BACK IN BED, REI HAIR PROVIDED TO PT. PT DENIES FURTHER NEEDS AT
THIS TIME.

## 2019-02-11 VITALS — SYSTOLIC BLOOD PRESSURE: 146 MMHG | DIASTOLIC BLOOD PRESSURE: 83 MMHG

## 2019-02-11 VITALS — DIASTOLIC BLOOD PRESSURE: 64 MMHG | SYSTOLIC BLOOD PRESSURE: 133 MMHG

## 2019-02-11 VITALS — DIASTOLIC BLOOD PRESSURE: 70 MMHG | SYSTOLIC BLOOD PRESSURE: 138 MMHG

## 2019-02-11 LAB
CHOLEST/HDLC SERPL: 3.5 RATIO (ref 2.3–4.1)
EST. AVERAGE GLUCOSE BLD GHB EST-MCNC: 240 MG/DL (ref 74–154)
HDLC SERPL-MCNC: 42 MG/DL (ref 32–96)
LDL-HDL RATIO: 2 RATIO (ref 1.5–3.5)
LDLC SERPL-MCNC: 86 MG/DL (ref 0–100)
TRIGL SERPL-MCNC: 87 MG/DL (ref 30–200)

## 2019-02-11 NOTE — NUR
B) PATIENT IS AWAKE AND ALERT TO PERSON, PLACE AND SITUATION.  CALM AND
COOPERATIVE WITH CARE AND ASSESSMENT.  DENIES ANY SI, CONTRACTS FOR SAFETY.
I) ADMINISTERED SCHEDULED MEDICATIONS.
R) COMPLAINT WITH MEDICATIONS.
P) MONITOR FOR CHANGES IN BEHAVIOR AND CONTINUE PLAN OF CARE.

## 2019-02-11 NOTE — NUR
PATIENT ARRIVED TO SENIOR CARE UNIT APPROXIMATELY AT 0100 FROM [facility] EMERGENCY ROOM- WITH THOUGHTS OF SUICIDAL IDEATION. PATIENT
DENIES HAVING A PLAN BUT SHE REPORTED "IF I HAD A BUTTON THEN I WOULD PUSH IT
AND I JUST DON'T WANT TO BE" PATIENT ADMITS TO HAVING DEPRESSION AND TAKES
FLUOXETINE FOR DEPRESSION BUT SHE SAYS SHE HAS NEVER FELT THIS WAY BEFORE.
PATIENT DOES HAVE A LOT OF EXPRESSION TO HER FACE AND HAS GOOD EYE CONTACT AND
ANSWERS ALL QUESTIONS APPROPRIATELY, SHE IS AWAKE, ALERT AND AWARE OF HERSELF,
DATE, TIME AND SITUATION. SHE CAME HERE VOLUNTARILY AND SHE SIGNED HER CONSENT
PAPERS, SHE IS A DNR, HER CODE WORD IS GUZMAN, PATIENT WAS ORIENTED TO HER
ROOM, BATHROOM, AND THE UNIT RULES WERE DISCUSSED AND SHE UNDERSTANDS. PATIENT
HAS HER OWN WALKER.

## 2019-02-12 VITALS — SYSTOLIC BLOOD PRESSURE: 153 MMHG | DIASTOLIC BLOOD PRESSURE: 77 MMHG

## 2019-02-12 VITALS — DIASTOLIC BLOOD PRESSURE: 67 MMHG | SYSTOLIC BLOOD PRESSURE: 144 MMHG

## 2019-02-12 NOTE — NUR
RECEIVED IN PATIENT ROOM. RESTING IN BED WITH EYES OPEN. CALM AND COOPERATIVE
WITH CARE AND ASSESSMENT. DENIES THOUGHTS OF SELF HARM. REDIRECT AND REORIENT
AS NEEDED. RESTING IN BED WITH EYES CLOSED AT THIS TIME. CONTINUE PLAN OF
CARE.

## 2019-02-12 NOTE — NUR
RECEIVED IN PATIENT ROOM. RESTING IN BED WITH EYES OPEN. CALM AND COOPERATIVE
WT CARE AND ASSESSMENT. NO THOUGHTS OF SELF HARM. REDIRECT AND REORIENT AS
NEEDED. RESTING IN BED WITH EYES CLSOED AT THIS TIME. CONTINUE PLAN OF CARE.

## 2019-02-13 VITALS — SYSTOLIC BLOOD PRESSURE: 145 MMHG | DIASTOLIC BLOOD PRESSURE: 82 MMHG

## 2019-02-13 NOTE — NUR
Nutrition Follow Up:
Chart reviewed
Diet: ADA
PO Intake: 100% meal avg
No BM since admit
Labs reviewed - Glucose continues elevated
Meds noted
Rec continue current diet.
RD following.

## 2019-02-13 NOTE — NUR
RECEIVED IN HALLWAY OUTSIDE OF NURSES STATION. REQUESTING IMODIUM FOR HER
DIARRHEA. CALM AND COOPERATIVE WITH CARE AND ASSESSMENT. DENIES THOUGTS OF
SELF HARM. REDIRECT AND REORIENT AS NEEDED. RESTING IN BED WITH EYES CLOSED AT
THIS TIME. CONTINUE PLAN OF CARE.

## 2019-02-13 NOTE — NUR
PATIENT REPORTED TO HAD 5 DIARRHEA EPISODES TODAY AND 2 MORE AT SHIFT CHANGE.
REQUESTS SOMETHING TO HELP WITH DIARRHEA. DOCTOR YUAN CALLED AND NOTIFIED.
NEW ORDER FOR IMODIUM 2 MG ONE TIME AND ORDER TO CHECK FOR CDIFF RECEIVED.

## 2019-02-14 VITALS — SYSTOLIC BLOOD PRESSURE: 182 MMHG | DIASTOLIC BLOOD PRESSURE: 95 MMHG

## 2019-02-14 VITALS — DIASTOLIC BLOOD PRESSURE: 67 MMHG | SYSTOLIC BLOOD PRESSURE: 146 MMHG

## 2019-02-14 VITALS — DIASTOLIC BLOOD PRESSURE: 74 MMHG | SYSTOLIC BLOOD PRESSURE: 154 MMHG

## 2019-02-14 NOTE — PN
PATIENT:ROSALIE HERNANDEZ                            MEDICAL RECORD: [id number]
                                                         [facility]
                                                         ADMISSION DATE: 02/11/19
 
PROGRESS NOTE
 
 
DATE OF SERVICE:  02/13/2019
 
SUBJECTIVE:  The patient's case was discussed with staff.  She has no new
complaint.
 
OBJECTIVE:  The patient is in good behavioral control with poor insight about
her condition.  She is denying any thoughts of harming herself or others and
says that she thinks she has significantly improved since being hospitalized
here.
 
ASSESSMENT:  No change in diagnoses.
 
PLAN:  Supportive and educational interventions were made.  The patient's
long-term prognosis is guarded and I am encouraged by her improvement.  I do
have concerns about how socially isolated she is living alone.
 
TRANSINT:[id number] Voice Confirmation ID: [id number] DOCUMENT ID: [id number]
 
 
 
 
                                           
                                           SILVIANO WINN MD             
 
 
 
Electronically Signed by SILVIANO WINN on 02/14/19 at 1550
 
 
 
 
 
 
 
 
 
 
 
 
 
 
 
 
 
CC:                                                             [id number]
DICTATION DATE: 02/13/19 1642     :     02/13/19 1803      ADM IN  
                                                                              
[id number]

## 2019-02-14 NOTE — PN
PATIENT:ROSALIE HERNANDEZ                            MEDICAL RECORD: [id number]
                                                         [facility]
                                                         ADMISSION DATE: 02/11/19
 
PROGRESS NOTE
 
 
DATE OF SERVICE:  02/12/2019
 
SUBJECTIVE:  The patient's case was discussed with staff.  She has no new
complaint.
 
OBJECTIVE:  The patient is in good behavioral control with limited insight about
her condition.  She does tolerate her medicines well.  Her mood has improved.
 
ASSESSMENT:  No change in diagnoses.
 
PLAN:  The patient will be given Cymbalta for its antidepressant effect.  The
dose will be increased slightly.
 
TRANSINT:[id number] Voice Confirmation ID: [id number] DOCUMENT ID: [id number]
 
 
 
 
                                           
                                           SILVIANO WINN MD             
 
 
 
Electronically Signed by SILVIANO WINN on 02/14/19 at 1550
 
 
 
 
 
 
 
 
 
 
 
 
 
 
 
 
 
 
 
 
CC:                                                             [id number]
DICTATION DATE: 02/12/19 1634     :     02/12/19 1651      ADM IN  
                                                                              
[facility]                                          
[address] [place] [address]

## 2019-02-14 NOTE — NUR
B) The patient is awake and alert, she denies depression today, she is trying
to sit away from the others as she says she has had diarrhea last night. She
is oriented times 3, she denies S. I. today. She self propels in a w/c. I)
Provide prescribed meds. R) The patient is compliant with medications and unit
milieu. P) Continue POC.

## 2019-02-14 NOTE — NUR
RECEIVED IN PATIENT ROOM. RESTING IN BED WITH EYES OPEN. CALM AND COOPERATIVE
WITH CARE AND ASSESSMEMT. DENIES THOUGHTS OF SELF HARM. REDIRECT AND REORIENT
AS NEEDED. RESTING IN BED WITH EYES OPEN AT THIS TIME. CONTINUE PLAN OF CARE.

## 2019-02-15 VITALS — SYSTOLIC BLOOD PRESSURE: 154 MMHG | DIASTOLIC BLOOD PRESSURE: 70 MMHG

## 2019-02-15 VITALS — SYSTOLIC BLOOD PRESSURE: 197 MMHG | DIASTOLIC BLOOD PRESSURE: 91 MMHG

## 2019-02-15 NOTE — PN
PATIENT:ROSALIE HERNANDEZ                            MEDICAL RECORD: [id number]
                                                         [facility]ATION:[facility]
                                                         ADMISSION DATE: 02/11/19
 
PROGRESS NOTE
 
 
DATE OF SERVICE:  02/14/2019
 
SUBJECTIVE:  The patient's case was discussed with staff.  She has no new
complaint.
 
OBJECTIVE:  The patient is in good behavioral control.  She has no thoughts of
harming herself or others and has a near euthymic mood.  I have increased her
Cymbalta slightly and will possibly increase it a little bit more before
discharging her.  I do anticipate she will be discharged soon given her
improvement and absence of suicidal thoughts.
 
TRANSINT:[id number] Voice Confirmation ID: [id number] DOCUMENT ID: [id number]
 
 
 
 
                                           
                                           SILVIANO WINN MD             
 
 
 
Electronically Signed by SILVIANO WINN on 02/15/19 at 1353
 
 
 
 
 
 
 
 
 
 
 
 
 
 
 
 
 
 
 
 
 
 
CC:                                                             [id number]
DICTATION DATE: 02/14/19 1707     :     02/14/19 2240      ADM IN  
                                                                              
[place] [id number] [place] [id number]

## 2019-02-15 NOTE — NUR
PATIENT IS ALERT AND ORIENTED. REQUESTS NEEDS, HER DEPRESSION "SEE SAWS", SHE
REPORTS, SHE IS PASSIVE. COMPLIANT WITH MEDS. WILL FOLLOW POC

## 2019-02-15 NOTE — NUR
CALM, COOPERATIVE WITH CARE. NO SI EXPRESSED. FALL PRECAUTIONS MAINTAINED.
MEDICATIONS GIVEN AS ORDERED. EDUCATED PT ON COPING SKILLS TO DEAL WITH
DEPRESSION SUCH AS JOURNALING TO WRITE DOWN FEELINGS. WILL CONTINUE TO MONITOR
AND CONTINUE WITH PLAN OF CARE.

## 2019-02-16 VITALS — DIASTOLIC BLOOD PRESSURE: 87 MMHG | SYSTOLIC BLOOD PRESSURE: 171 MMHG

## 2019-02-16 NOTE — PN
PATIENT:ROSALIE HERNANDEZ                            MEDICAL RECORD: [id number]
                                                         [facility]ATION:[facility]
                                                         ADMISSION DATE: 02/11/19
 
PROGRESS NOTE
 
 
DATE OF SERVICE:  02/15/2019
 
SUBJECTIVE:  The patient's case was discussed with staff.  She has no new
complaint.
 
OBJECTIVE:  The patient is in good behavioral control with no thoughts of
self-harm.
 
ASSESSMENT:  No change in diagnoses.
 
PLAN:  Brief supportive and educational interventions were made.  Long-term
prognosis is guarded.
 
TRANSINT:[id number] Voice Confirmation ID: [id number] DOCUMENT ID: [id number]
 
 
 
 
                                           
                                           SILVIANO IWNN MD             
 
 
 
Electronically Signed by SILVIANO WINN on 02/16/19 at 1217
 
 
 
 
 
 
 
 
 
 
 
 
 
 
 
 
 
 
 
 
CC:                                                             [id number]
DICTATION DATE: 02/15/19 1602     :     02/15/19 2315      ADM IN  
                                                                              
[place] [id number] [place] [address]

## 2019-02-16 NOTE — NUR
B) The patient is condescending in her speaking to staff. She says "So, you
mean to tell me this is all we are going to do today?" Explained to her that
"Yes, the weekends are mellow and patients can do crafts or read, visitation
is later." The patient self propels in her w/c. She can transfer on her own.
I) Provide prescribed medications. Encourage the patient to do crafts, word
puzzles. R) The patient is compliant with meds and unit milieu. P) Continue
POC.

## 2019-02-16 NOTE — NUR
The patient has been d/c'd from the hospital, Dr. Bolanos escribed her meds to
the [employer] [facility] on [address] in [place], did provide a copy of
her med list and did explain that she will need to make an appointment with
her PCP in one week from d/c. All of her belongings are given back to her.
Called her friend Lian Sheffield to come pick her up. She is assisted by staff to the
car and she is now d/c'd off of the unit.

## 2019-02-17 NOTE — PN
PATIENT:ROSALIE HERNANDEZ                            MEDICAL RECORD: [id number]
                                                         LOCATION:[facility]
                                                         ADMISSION DATE: 02/11/19
 
PROGRESS NOTE
 
 
DATE OF SERVICE:  02/16/2019
 
SUBJECTIVE:  The patient's case was discussed with staff.  She has no new
complaint.
 
OBJECTIVE:  The patient denies intent to harm herself or others.  She has a
euthymic mood and is completely appropriate.
 
ASSESSMENT:  No change in diagnoses.
 
PLAN:  The patient requests discharge and it seems reasonable and appropriate to
me.  Perhaps it would be more convenient on a weekday instead of a weekend, but
again there is no reason for her to be hospitalized an additional day or two
given her overall condition.  I do not think she is severely depressed.  I do
not think she is an acute risk.  I do think she has a significant
characterologic component to this behavior and is significantly involved and
attention seeking and help-rejecting behavior.  She will be discharged today. 
She does not want to have follow-up psychiatric care.
 
TRANSINT:[id number] Voice Confirmation ID: [id number] DOCUMENT ID: [id number]
 
 
 
 
                                           
                                           SILVIANO WINN MD             
 
 
 
Electronically Signed by SILVIANO WINN on 02/17/19 at 1149
 
 
 
 
 
 
 
 
 
 
 
 
 
 
CC:                                                             [id number]
DICTATION DATE: 02/16/19 1408     :     02/16/19 1440      DIS IN  
                                                                      02/16/19
[facility]                                          
[address] [place] [address]

## 2019-02-19 NOTE — DS
PATIENT:ROSALIE HERNANDEZ                    :[birth year]   MEDICAL RECORD: [id number]
 
                              DISCHARGE SUMMARY
                                                         
ADMISSION DATE:    19                       DISCHARGE DATE:     19
 
 
IDENTIFYING DATA:  The patient is 73 years old and she was admitted to the
hospital secondary to suicidal thoughts.  The patient was brought here by an
ambulance and had a lot of depressive symptoms and felt hopeless, isolated, and
withdrawn.  She did not have a specific plan.  She denied psychotic symptoms.
 
HOSPITAL COURSE:  The patient was admitted to the hospital and comprehensively
evaluated from both the medical, psychological, and social standpoint.  She was
treated with both mood stabilizing and memory-enhancing medications.  She did
show improvement through the course of the hospitalization.  She was
subsequently transitioned home.
 
DISCHARGE DIAGNOSES:
AXIS I:  Major depression, severe, recurrent without psychotic features.
AXIS II:  Cluster B personality traits.
AXIS III:  Urinary tract infection, hypothyroidism, and hypertension.
AXIS IV:  Moderate stressors.
AXIS V:  Global Assessment Of Functioning is 45.
 
PLAN:  At the time of discharge, the patient was in good behavioral control with
no thoughts of harming herself or others.  She was tolerating her medicines
well.
 
Followup is to be with her primary care physician.
 
TRANSINT:[id number] Voice Confirmation ID: [id number] DOCUMENT ID: [id number]
                                           
                                           SILVIANO WINN MD             
 
 
 
Electronically Signed by SILVIANO WINN on 19 at 1202
 
 
 
 
 
 
 
 
 
 
 
 
CC:                                                             [id number]
DICTATION DATE: 19 1603     :     19 0326      DIS IN  
                                                                      19
[facility]                                          
[address] [place] [address]

## 2019-03-22 ENCOUNTER — HOSPITAL ENCOUNTER (INPATIENT)
Dept: HOSPITAL 84 - D.ER | Age: 74
LOS: 4 days | Discharge: NURSING, REHAB OR LONG-TERM CARE | DRG: 638 | End: 2019-03-26
Attending: FAMILY MEDICINE | Admitting: FAMILY MEDICINE
Payer: MEDICARE

## 2019-03-22 VITALS
BODY MASS INDEX: 28.2 KG/M2 | BODY MASS INDEX: 28.2 KG/M2 | WEIGHT: 190.4 LBS | WEIGHT: 190.4 LBS | HEIGHT: 69 IN | BODY MASS INDEX: 28.2 KG/M2 | HEIGHT: 69 IN

## 2019-03-22 VITALS — DIASTOLIC BLOOD PRESSURE: 63 MMHG | SYSTOLIC BLOOD PRESSURE: 129 MMHG

## 2019-03-22 DIAGNOSIS — N39.0: ICD-10-CM

## 2019-03-22 DIAGNOSIS — I10: ICD-10-CM

## 2019-03-22 DIAGNOSIS — E11.9: Primary | ICD-10-CM

## 2019-03-22 DIAGNOSIS — R41.0: ICD-10-CM

## 2019-03-22 DIAGNOSIS — E11.40: ICD-10-CM

## 2019-03-22 LAB
ALBUMIN SERPL-MCNC: 3.2 G/DL (ref 3.4–5)
ALP SERPL-CCNC: 103 U/L (ref 46–116)
ALT SERPL-CCNC: 25 U/L (ref 10–68)
ANION GAP SERPL CALC-SCNC: 13.7 MMOL/L (ref 8–16)
APPEARANCE UR: CLEAR
BACTERIA #/AREA URNS HPF: (no result) /HPF
BASOPHILS NFR BLD AUTO: 0.5 % (ref 0–2)
BILIRUB SERPL-MCNC: 0.52 MG/DL (ref 0.2–1.3)
BILIRUB SERPL-MCNC: NEGATIVE MG/DL
BUN SERPL-MCNC: 19 MG/DL (ref 7–18)
CALCIUM SERPL-MCNC: 9.4 MG/DL (ref 8.5–10.1)
CHLORIDE SERPL-SCNC: 97 MMOL/L (ref 98–107)
CK MB SERPL-MCNC: 2.9 U/L (ref 0–3.6)
CK SERPL-CCNC: 104 UL (ref 21–215)
CO2 SERPL-SCNC: 27.6 MMOL/L (ref 21–32)
COLOR UR: YELLOW
CREAT SERPL-MCNC: 0.8 MG/DL (ref 0.6–1.3)
EOSINOPHIL NFR BLD: 0.7 % (ref 0–7)
ERYTHROCYTE [DISTWIDTH] IN BLOOD BY AUTOMATED COUNT: 12.6 % (ref 11.5–14.5)
EST. AVERAGE GLUCOSE BLD GHB EST-MCNC: 223 MG/DL (ref 74–154)
GLOBULIN SER-MCNC: 3.6 G/L
GLUCOSE SERPL-MCNC: 250 MG/DL
GLUCOSE SERPL-MCNC: 375 MG/DL (ref 74–106)
HCT VFR BLD CALC: 40.2 % (ref 36–48)
HGB BLD-MCNC: 13.9 G/DL (ref 12–16)
IMM GRANULOCYTES NFR BLD: 0.2 % (ref 0–5)
INR PPP: 0.99 (ref 0.85–1.17)
KETONES UR STRIP-MCNC: (no result) MG/DL
LYMPHOCYTES NFR BLD AUTO: 18.1 % (ref 15–50)
MCH RBC QN AUTO: 30.2 PG (ref 26–34)
MCHC RBC AUTO-ENTMCNC: 34.6 G/DL (ref 31–37)
MCV RBC: 87.4 FL (ref 80–100)
MONOCYTES NFR BLD: 8.2 % (ref 2–11)
NEUTROPHILS NFR BLD AUTO: 72.3 % (ref 40–80)
NITRITE UR-MCNC: NEGATIVE MG/ML
OSMOLALITY SERPL CALC.SUM OF ELEC: 284 MOSM/KG (ref 275–300)
PH UR STRIP: 7 [PH] (ref 5–6)
PLATELET # BLD: 263 10X3/UL (ref 130–400)
PMV BLD AUTO: 10.5 FL (ref 7.4–10.4)
POTASSIUM SERPL-SCNC: 4.3 MMOL/L (ref 3.5–5.1)
PROT SERPL-MCNC: 6.8 G/DL (ref 6.4–8.2)
PROT UR-MCNC: (no result) MG/DL
PROTHROMBIN TIME: 12.6 SECONDS (ref 11.6–15)
RBC # BLD AUTO: 4.6 10X6/UL (ref 4–5.4)
RBC #/AREA URNS HPF: (no result) /HPF (ref 0–5)
SODIUM SERPL-SCNC: 134 MMOL/L (ref 136–145)
SP GR UR STRIP: 1.01 (ref 1–1.02)
SQUAMOUS #/AREA URNS HPF: (no result) /HPF (ref 0–5)
TROPONIN I SERPL-MCNC: 0.04 NG/ML (ref 0–0.06)
UROBILINOGEN UR-MCNC: NORMAL MG/DL
WBC # BLD AUTO: 10.6 10X3/UL (ref 4.8–10.8)
WBC #/AREA URNS HPF: (no result) /HPF (ref 0–5)

## 2019-03-22 NOTE — NUR
RECEIVED CARE FROM DAY NURSE.  SITTING ON SIDE OF BED.  ASSISSTED TO BSC AND
BACK TO BED.  NO OTHER NEEDS VOICED AT THIS TIME.  CALL LIGHT AT SIDE.  IV
INFUSING AS PER ORDER TO LEFT AC.

## 2019-03-23 VITALS — SYSTOLIC BLOOD PRESSURE: 119 MMHG | DIASTOLIC BLOOD PRESSURE: 86 MMHG

## 2019-03-23 VITALS — SYSTOLIC BLOOD PRESSURE: 129 MMHG | DIASTOLIC BLOOD PRESSURE: 63 MMHG

## 2019-03-23 VITALS — DIASTOLIC BLOOD PRESSURE: 57 MMHG | SYSTOLIC BLOOD PRESSURE: 131 MMHG

## 2019-03-23 VITALS — SYSTOLIC BLOOD PRESSURE: 150 MMHG | DIASTOLIC BLOOD PRESSURE: 90 MMHG

## 2019-03-23 VITALS — DIASTOLIC BLOOD PRESSURE: 73 MMHG | SYSTOLIC BLOOD PRESSURE: 144 MMHG

## 2019-03-23 VITALS — DIASTOLIC BLOOD PRESSURE: 82 MMHG | SYSTOLIC BLOOD PRESSURE: 152 MMHG

## 2019-03-23 NOTE — NUR
RECEIVED CARE FROM DAY NURSE.  SITTING UP ON SIDE OF BED.  REPORTS JUST SELF
TRANSFERRED BACK TO BED FROM BS.  POSEY ALARM TURNED ON AT THIS TIME.  IV
INFUSING AS PER ORDER TO LEFT AC.  NO NEEDS VOICED AT THIS TIME.  CALL LIGHT
AT SIDE.

## 2019-03-23 NOTE — NUR
PT LYING IN BED HOLLARING OUT FOR HELP, STATES HER FEET ARE SWOLLEN AD SHE
CAN'T FEEL THEM, ELEVATED PT FEET AND PLACED SOCKS ON [PER HER REQUEST, PT
STATES SHE WANTS ADMINISTRATION NECAUSE SHE FEELS AS IF THIS IS CRUEL
PUNISHMENT THAT NO ONE IS HERE AT HER SIDE TO ASSIST WHEN SHE NEEDS IT, STATES
ROOM IS TOO COLD FOR HER, PT THERMOSTAT IS SET ON 80 ENCOURAGED PT TO SHUT
DOOR AND TO CONTAIN HEAT TO ROOM BECAUSE AIR IS FLOWING OUT TO HALLWAY AND
HEATING THE HALLS. PT THEN REQUESTED TEMP BE RETAKEN AND PT TEMP IS 98.0 TAKEN
ORALLY. CONTINUE WITH PLAN OF CARE

## 2019-03-23 NOTE — NUR
PT LYING IN BED ON BACKSIDE, EVEN RISE AND FALL OF CHEST NO SIGNS OF LABORED
BREATHING, BED IN LOWEST POSITION, NO S/S OF DISTRESS, CL IN REACH ASSUME PT
CARE AND CONTINUE WITH PLAN OF CARE

## 2019-03-24 VITALS — SYSTOLIC BLOOD PRESSURE: 148 MMHG | DIASTOLIC BLOOD PRESSURE: 70 MMHG

## 2019-03-24 VITALS — SYSTOLIC BLOOD PRESSURE: 160 MMHG | DIASTOLIC BLOOD PRESSURE: 59 MMHG

## 2019-03-24 VITALS — DIASTOLIC BLOOD PRESSURE: 64 MMHG | SYSTOLIC BLOOD PRESSURE: 141 MMHG

## 2019-03-24 VITALS — DIASTOLIC BLOOD PRESSURE: 80 MMHG | SYSTOLIC BLOOD PRESSURE: 165 MMHG

## 2019-03-24 VITALS — SYSTOLIC BLOOD PRESSURE: 145 MMHG | DIASTOLIC BLOOD PRESSURE: 70 MMHG

## 2019-03-24 LAB
ALBUMIN SERPL-MCNC: 2.8 G/DL (ref 3.4–5)
ALP SERPL-CCNC: 81 U/L (ref 46–116)
ALT SERPL-CCNC: 27 U/L (ref 10–68)
ANION GAP SERPL CALC-SCNC: 11.5 MMOL/L (ref 8–16)
BASOPHILS NFR BLD AUTO: 0.3 % (ref 0–2)
BILIRUB SERPL-MCNC: 0.48 MG/DL (ref 0.2–1.3)
BUN SERPL-MCNC: 15 MG/DL (ref 7–18)
CALCIUM SERPL-MCNC: 8.5 MG/DL (ref 8.5–10.1)
CHLORIDE SERPL-SCNC: 104 MMOL/L (ref 98–107)
CO2 SERPL-SCNC: 27.2 MMOL/L (ref 21–32)
CREAT SERPL-MCNC: 0.6 MG/DL (ref 0.6–1.3)
EOSINOPHIL NFR BLD: 0.8 % (ref 0–7)
ERYTHROCYTE [DISTWIDTH] IN BLOOD BY AUTOMATED COUNT: 12.6 % (ref 11.5–14.5)
GLOBULIN SER-MCNC: 3.3 G/L
GLUCOSE SERPL-MCNC: 106 MG/DL (ref 74–106)
HCT VFR BLD CALC: 37.8 % (ref 36–48)
HGB BLD-MCNC: 12.7 G/DL (ref 12–16)
IMM GRANULOCYTES NFR BLD: 0.3 % (ref 0–5)
LYMPHOCYTES NFR BLD AUTO: 21.2 % (ref 15–50)
MCH RBC QN AUTO: 29.5 PG (ref 26–34)
MCHC RBC AUTO-ENTMCNC: 33.6 G/DL (ref 31–37)
MCV RBC: 87.9 FL (ref 80–100)
MONOCYTES NFR BLD: 7.1 % (ref 2–11)
NEUTROPHILS NFR BLD AUTO: 70.3 % (ref 40–80)
OSMOLALITY SERPL CALC.SUM OF ELEC: 278 MOSM/KG (ref 275–300)
PLATELET # BLD: 259 10X3/UL (ref 130–400)
PMV BLD AUTO: 10.4 FL (ref 7.4–10.4)
POTASSIUM SERPL-SCNC: 3.7 MMOL/L (ref 3.5–5.1)
PROT SERPL-MCNC: 6.1 G/DL (ref 6.4–8.2)
RBC # BLD AUTO: 4.3 10X6/UL (ref 4–5.4)
SODIUM SERPL-SCNC: 139 MMOL/L (ref 136–145)
WBC # BLD AUTO: 11.9 10X3/UL (ref 4.8–10.8)

## 2019-03-24 NOTE — NUR
IV IN LEFT FA LEAKING.  DC'D WITH TIP INTACT.  RESITED TO LEFT FA.  20 GAUGE
X1 STICK WITH GOOD BLOOD RETURN NOTED.  NEEDED NEW GOWN AND NO YELLOW GOWNS
AVAILABLE.  REGULAR HOSPITAL GOWN PUT ON.

## 2019-03-24 NOTE — NUR
MORNING ASSESSMENT COMPLETE. SEE ASSESSMENT FLOWSHEET FOR FURTHER DETAILS. PT
LYING IN BED AAO X3 TO PERSON, PLACE, AND TIME. DISORIENTED TO SITUATION.
DENIES NEEDS AT THIS TIME. CL IN REACH. SIDE RAILS UP X3 FOR PATIENT SAEFTY.
BED IN LOWEST POSITION

## 2019-03-24 NOTE — NUR
RECEIVED CARE FROM DAY NURSE.  SITTING UP ON SIDE OF BED.  ASSISSTED TO BSC.
REPORTS NO OTHER NEEDS AT THIS TIME.  CALL LIGHT AT SIDE.  DAVID MAT IN PLACE
AND ARMED.

## 2019-03-25 VITALS — DIASTOLIC BLOOD PRESSURE: 67 MMHG | SYSTOLIC BLOOD PRESSURE: 130 MMHG

## 2019-03-25 VITALS — DIASTOLIC BLOOD PRESSURE: 60 MMHG | SYSTOLIC BLOOD PRESSURE: 157 MMHG

## 2019-03-25 VITALS — SYSTOLIC BLOOD PRESSURE: 148 MMHG | DIASTOLIC BLOOD PRESSURE: 66 MMHG

## 2019-03-25 VITALS — DIASTOLIC BLOOD PRESSURE: 73 MMHG | SYSTOLIC BLOOD PRESSURE: 136 MMHG

## 2019-03-25 VITALS — DIASTOLIC BLOOD PRESSURE: 66 MMHG | SYSTOLIC BLOOD PRESSURE: 111 MMHG

## 2019-03-25 VITALS — SYSTOLIC BLOOD PRESSURE: 106 MMHG | DIASTOLIC BLOOD PRESSURE: 67 MMHG

## 2019-03-25 LAB
ALBUMIN SERPL-MCNC: 2.8 G/DL (ref 3.4–5)
ALP SERPL-CCNC: 78 U/L (ref 46–116)
ALT SERPL-CCNC: 20 U/L (ref 10–68)
ANION GAP SERPL CALC-SCNC: 14.3 MMOL/L (ref 8–16)
BASOPHILS NFR BLD AUTO: 0.3 % (ref 0–2)
BILIRUB SERPL-MCNC: 0.35 MG/DL (ref 0.2–1.3)
BUN SERPL-MCNC: 14 MG/DL (ref 7–18)
CALCIUM SERPL-MCNC: 8.7 MG/DL (ref 8.5–10.1)
CHLORIDE SERPL-SCNC: 103 MMOL/L (ref 98–107)
CO2 SERPL-SCNC: 25.5 MMOL/L (ref 21–32)
CREAT SERPL-MCNC: 0.4 MG/DL (ref 0.6–1.3)
EOSINOPHIL NFR BLD: 0.7 % (ref 0–7)
ERYTHROCYTE [DISTWIDTH] IN BLOOD BY AUTOMATED COUNT: 12.8 % (ref 11.5–14.5)
GLOBULIN SER-MCNC: 2.6 G/L
GLUCOSE SERPL-MCNC: 83 MG/DL (ref 74–106)
HCT VFR BLD CALC: 37 % (ref 36–48)
HGB BLD-MCNC: 12.7 G/DL (ref 12–16)
IMM GRANULOCYTES NFR BLD: 0.3 % (ref 0–5)
LYMPHOCYTES NFR BLD AUTO: 14.9 % (ref 15–50)
MCH RBC QN AUTO: 30.2 PG (ref 26–34)
MCHC RBC AUTO-ENTMCNC: 34.3 G/DL (ref 31–37)
MCV RBC: 88.1 FL (ref 80–100)
MONOCYTES NFR BLD: 7.2 % (ref 2–11)
NEUTROPHILS NFR BLD AUTO: 76.6 % (ref 40–80)
OSMOLALITY SERPL CALC.SUM OF ELEC: 277 MOSM/KG (ref 275–300)
PLATELET # BLD: 228 10X3/UL (ref 130–400)
PMV BLD AUTO: 10.4 FL (ref 7.4–10.4)
POTASSIUM SERPL-SCNC: 3.8 MMOL/L (ref 3.5–5.1)
PROT SERPL-MCNC: 5.4 G/DL (ref 6.4–8.2)
RBC # BLD AUTO: 4.2 10X6/UL (ref 4–5.4)
SODIUM SERPL-SCNC: 139 MMOL/L (ref 136–145)
WBC # BLD AUTO: 11.5 10X3/UL (ref 4.8–10.8)

## 2019-03-25 NOTE — NUR
PT SITTING UP ON SIDE OF BED REQUESTING TO GO TO RESTROOM.  ASSISTED PT FROM
BED TO BEDSIDE COMMODE AND BACK TO BED X 1 ASSIST.  PT TIERRA WELL.  NO ACUTE
DISTRESS NOTED.  IV TO LEFT FOREARM WITH NS @ 75ML/HR INFUSING VIA PUMP.  SITE
WITHOUT REDNESS OR EDEMA.  REPORTS PAIN 3/10 AT THIS TIME.  DISCUSSED
SCHEDULED TRAMADOL DUE. PT RECEPTIVE.  DENIES FURTHER NEEDS AT THIS TIME.  CL
WITHIN REACH.  ENCOURAGED TO CALL WITH NEEDS.  CONTINUE POC

## 2019-03-25 NOTE — NUR
ASSESSMENT AS PER FLOWSHEET. IV PAENT LT FOREARM OF NS AT 75CC'S/HR SITE
CLEAR. UP WITH HELP AND USE OF WALKER TO BSC VOIDS WELL.ASSISTED BACK TO BED.
SR UP X2 CALL LIGHT WITHIN REACH POSEY BED ALARM MAT ON

## 2019-03-25 NOTE — MORECARE
CASE MANAGEMENT DISCHARGE SUMMARY
 
 
PATIENT: ROSALIE HERNANDEZ                      UNIT: [id number]
ACCOUNT#: [id number]                       ADM DATE: 19
AGE: 74     : [birth year]  SEX: F            ROOM/BED: D.2218    
AUTHOR: ISAIAH,DOC                             PHYSICIAN:                               
 
REFERRING PHYSICIAN: KTAHY CRUM MD               
DATE OF SERVICE: 19
Discharge Plan
 
 
Patient Name: ROSALIE HERNANDEZ
Facility: [facility]:[place]
Encounter #: [id number]
Medical Record #: [id number]
: 1945
Planned Disposition: Skilled Nursing Facility
Anticipated Discharge Date: 
 
Discharge Date: 
Expected LOS: 
Initial Reviewer: [email or web address]
Initial Review Date: 2019
Generated: 3/25/19   4:07 pm 
Comments
 
DCP- Discharge Planning
 
Updated by [id number]: Mery Tafoya on 3/25/19   2:03 pm CT
Patient Name: ROSALIE HERNANDEZ                                     
Admission Status: ER   
Accout number: [id number]                              
Admission Date: 2019   
: 1945                                                        
Admission Diagnosis:   
Attending: KATHY CRUM                                                
Current LOS:  3   
  
Anticipated DC Date:    
Planned Disposition: Skilled Nursing Facility   
Primary Insurance: MEDICARE A & B   
  
  
Discharge Planning Comments:   
  
CM met with patient to assess discharge planning needs. Patient stated that 
she lives home alone and is having issues with ambulation and using her 
wheelchair at home at this time. She has a wheelchair but now is having 
 
issues with the wheelchair in her home. She stated that all of her family 
lives in [place] and spoke like they are estranged. She stated that she has a ramp 
to enter in her home and can't use her wheelchair any more to enter and exit 
her home. She is current with [facility], but needs things that they 
can not give her. She talked about how she feels that she has been isolated 
in her own home.  We talked about the different levels of care and therapy. 
She would like to try skilled and LONG with [facility]. I 
will send a referral. IMM served and explained. She has a walker, shower 
chair, and wheelchair at home. CM will continue to follow and assist with DC 
planning   
  
  
  
  
: Mery Tafoya
DCP- Discharge Planning
 
Updated by [email or web address]: Tasia [facility] on 3/23/19   9:22 pm CT
LATE ENTRY 1045  
DR CRUM SPOKE WITH CM. ADVISED PATIENT WILL NEED   
"PLACEMENT" AS SHE HAS FREQUENT FALLS , INCREASED CONFUSION AND CAN NO LONGER 
TAKE CARE OF HERSELF. CM TO FOLLOW FOR DISCHARGE PLANNING.
 DCPIA - Discharge Planning Initial Assessment
 
Updated by [id number]: Mery Tafoya on 3/25/19   2:57 pm
*  Is the patient Alert and Oriented?
Yes
*  How many steps to enter\exit or inside your home? RAMP *  PCP [facility] *  Pharmacy [facility]
[email or web address] [facility]  [place]
 
*  Preadmission Environment
Home Alone
*  ADLs
Partial Dependent
*  Partial ADLs (Assistance needed)
Ambulation
Transfers
*  Equipment
Shower Chair
Walker
Wheelchair
*  List name and contact numbers for known caregivers / representatives who 
currently or will assist patient after discharge:
ML CARD ([facility]IE[facility]) [phone]
*  Verbal permission to speak to the caregivers and representatives has been 
obtained from the patient.
N/A
*  Community resources currently utilized
Home Health
*  Please name any agencies selected above.
[facility]
*  Additional services required to return to the preadmission environment?
 
Yes
*  Can the patient safely return to the preadmission environment?
No
*  Has this patient been hospitalized within the prior 30 days at any 
hospital?
No
 
 
 
 
 
Coverage Notice
 
Reviewer: [id number] - Mery Tafoya
 
Notice Issued Date-Time: 2019  14:50
Notice Type: IM Discharge Notice
 
Notice Delivered To: Patient
Relationship to Patient: 
Representative Name: 
 
Delivery Method: HAND - Hand Delivered
Radha Days:
Prior Verbal Notification: 
 
Recipient Understood Notice: Yes
Recipient Signature: Yes
Med Rec Note Co-signed by Attending:
 
Coverage Notice Comment:  
 
Last DP export: 3/25/19   1:58 p
Patient Name: ROSALIE HERNANDEZ
 
Encounter #: [id number]
Page [id number]
 
 
 
 
 
Electronically Signed by GABINO COLON on 19 at 1508
 
 
 
 
 
 
**All edits/amendments must be made on the electronic document**
 
DICTATION DATE: 19     : NAN  19 150     
RPT#: [id number]                                DC DATE:        
                                               STATUS: ADM IN  
[facility]
 [place] [address]
***END OF REPORT***

## 2019-03-25 NOTE — MORECARE
CASE MANAGEMENT DISCHARGE SUMMARY
 
 
PATIENT: ROSALIE HERNANDEZ                      UNIT: [id number]
ACCOUNT#: [id number]                       ADM DATE: 19
AGE: 74     : [birth year]  SEX: F            ROOM/BED: D.2218    
AUTHOR: ISAIAH,DOC                             PHYSICIAN:                               
 
REFERRING PHYSICIAN: KATHY CRUM MD               
DATE OF SERVICE: 19
Discharge Plan
 
 
Patient Name: ROSALIE HERNANDEZ
Facility: [facility]:[place]
Encounter #: [id number]
Medical Record #: [id number]
: 1945
Planned Disposition: Skilled Nursing Facility
Anticipated Discharge Date: 
 
Discharge Date: 
Expected LOS: 
Initial Reviewer: [id number]
Initial Review Date: 2019
Generated: 3/25/19   4:16 pm 
Comments
 
DCP- Discharge Planning
 
Updated by [email or web address]: Mery Tafoya on 3/25/19   2:03 pm CT
Patient Name: ROSALIE HERNANDEZ                                     
Admission Status: ER   
Accout number: [id number]                              
Admission Date: 2019   
: 1945                                                        
Admission Diagnosis:   
Attending: KATHY CRUM                                                
Current LOS:  3   
  
Anticipated DC Date:    
Planned Disposition: Skilled Nursing Facility   
Primary Insurance: MEDICARE A & B   
  
  
Discharge Planning Comments:   
  
CM met with patient to assess discharge planning needs. Patient stated that 
she lives home alone and is having issues with ambulation and using her 
wheelchair at home at this time. She has a wheelchair but now is having 
 
issues with the wheelchair in her home. She stated that all of her family 
lives in [place] and spoke like they are estranged. She stated that she has a ramp 
to enter in her home and can't use her wheelchair any more to enter and exit 
her home. She is current with [facility], but needs things that they 
can not give her. She talked about how she feels that she has been isolated 
in her own home.  We talked about the different levels of care and therapy. 
She would like to try skilled and LONG with 1[facility]. I 
will send a referral. IMM served and explained. She has a walker, shower 
chair, and wheelchair at home. CM will continue to follow and assist with DC 
planning   
  
  
  
  
: Mery Tafoya
DCP- Discharge Planning
 
Updated by [id number]: Tasia [facility] on 3/23/19   9:22 pm CT
LATE ENTRY 1045  
DR CRUM SPOKE WITH CM. ADVISED PATIENT WILL NEED   
"PLACEMENT" AS SHE HAS FREQUENT FALLS , INCREASED CONFUSION AND CAN NO LONGER 
TAKE CARE OF HERSELF. CM TO FOLLOW FOR DISCHARGE PLANNING.
 DCPIA - Discharge Planning Initial Assessment
 
Updated by [email or web address]: Mery Tafoya on 3/25/19   2:57 pm
*  Is the patient Alert and Oriented?
Yes
*  How many steps to enter\exit or inside your home? RAMP *  PCP [facility] *  Pharmacy [facility]
 
*  Preadmission Environment
Home Alone
*  ADLs
Partial Dependent
*  Partial ADLs (Assistance needed)
Ambulation
Transfers
*  Equipment
Shower Chair
Walker
Wheelchair
*  List name and contact numbers for known caregivers / representatives who 
currently or will assist patient after discharge:
ML CARD (NIECE) [phone]
*  Verbal permission to speak to the caregivers and representatives has been 
obtained from the patient.
N/A
*  Community resources currently utilized
Home Health
*  Please name any agencies selected above.
[facility]
*  Additional services required to return to the preadmission environment?
 
Yes
*  Can the patient safely return to the preadmission environment?
No
*  Has this patient been hospitalized within the prior 30 days at any 
hospital?
No
 
External Providers
External Provider: [facility]
 
Next Contact Date: 
Service Request Date: 
Service Type: 
Resolution: 
 
Reviewer: 
Comments: 
 
 
 
 
Coverage Notice
 
Reviewer: [id number] - Mery Tafoya
 
Notice Issued Date-Time: 2019  14:50
Notice Type: IM Discharge Notice
 
Notice Delivered To: Patient
Relationship to Patient: 
Representative Name: 
 
Delivery Method: HAND - Hand Delivered
Radha Days:
Prior Verbal Notification: 
 
Recipient Understood Notice: Yes
Recipient Signature: Yes
Med Rec Note Co-signed by Attending:
 
Coverage Notice Comment:  
 
Last DP export: 3/25/19   2:07 p
Patient Name: ROSALIE HERNANDEZ
 
Encounter #: [id number]
Page [id number]
 
 
 
 
 
Electronically Signed by GABINO COLON on 19 at 1516
 
 
 
 
 
 
**All edits/amendments must be made on the electronic document**
 
DICTATION DATE: 19     : NAN  19     
RPT#: [id number]                                DC DATE:        
                                               STATUS: ADM IN  
[place] [address] [place] [address]
***END OF REPORT***

## 2019-03-25 NOTE — MORECARE
CASE MANAGEMENT DISCHARGE SUMMARY
 
 
PATIENT: ROSALIE HERNANDEZ                      UNIT: [id number]
ACCOUNT#: [id number]                       ADM DATE: 19
AGE: 74     : [birth year]  SEX: F            ROOM/BED: D.2218    
AUTHOR: GABINO COLON                             PHYSICIAN:                               
 
REFERRING PHYSICIAN: KATHY CRUM MD               
DATE OF SERVICE: 19
Discharge Plan
 
 
Patient Name: ROSALIE HERNANDEZ
Facility: [facility]:[place]
Encounter #: [id number]
Medical Record #: [id number]
: 1945
Planned Disposition: Skilled Nursing Facility
Anticipated Discharge Date: 
 
Discharge Date: 
Expected LOS: 
Initial Reviewer: [id number]
Initial Review Date: 2019
Generated: 3/25/19   3:58 pm 
Comments
 
DCP- Discharge Planning
 
Updated by [email or web address]: Tasia [facility] on 3/23/19   9:22 pm CT
LATE ENTRY 1045  
DR CRUM SPOKE WITH CM. ADVISED PATIENT WILL NEED   
"PLACEMENT" AS SHE HAS FREQUENT FALLS , INCREASED CONFUSION AND CAN NO LONGER 
TAKE CARE OF HERSELF. CM TO FOLLOW FOR DISCHARGE PLANNING.
 DCPIA - Discharge Planning Initial Assessment
 
Updated by [id number]: Mery Tafoya on 3/25/19   2:57 pm
*  Is the patient Alert and Oriented?
Yes
*  How many steps to enter\exit or inside your home? RAMP *  PCP [facility] *  Pharmacy [facility]
 
*  Preadmission Environment
Home Alone
*  ADLs
Partial Dependent
*  Partial ADLs (Assistance needed)
Ambulation
Transfers
 
*  Equipment
Shower Chair
Walker
Wheelchair
*  List name and contact numbers for known caregivers / representatives who 
currently or will assist patient after discharge:
[facility] [phone]
*  Verbal permission to speak to the caregivers and representatives has been 
obtained from the patient.
N/A
*  Community resources currently utilized
Home Health
*  Please name any agencies selected above.
[facility]
*  Additional services required to return to the preadmission environment?
Yes
*  Can the patient safely return to the preadmission environment?
No
*  Has this patient been hospitalized within the prior 30 days at any 
hospital?
No
 
 
 
 
 
 
Patient Name: ROSALIE HERNANDEZ
Encounter #: [id number]
Page [phone]
 
 
 
 
 
Electronically Signed by GABINO COLON on 19 at 1458
 
 
 
 
 
 
**All edits/amendments must be made on the electronic document**
 
DICTATION DATE: 19     : NAN  19     
RPT#: [id number]                                DC DATE:        
                                               STATUS: ADM IN  
[facility]
 [place] [address]
***END OF REPORT***

## 2019-03-26 VITALS — SYSTOLIC BLOOD PRESSURE: 167 MMHG | DIASTOLIC BLOOD PRESSURE: 64 MMHG

## 2019-03-26 VITALS — DIASTOLIC BLOOD PRESSURE: 70 MMHG | SYSTOLIC BLOOD PRESSURE: 121 MMHG

## 2019-03-26 VITALS — DIASTOLIC BLOOD PRESSURE: 60 MMHG | SYSTOLIC BLOOD PRESSURE: 154 MMHG

## 2019-03-26 LAB
ALBUMIN SERPL-MCNC: 2.6 G/DL (ref 3.4–5)
ALP SERPL-CCNC: 72 U/L (ref 46–116)
ALT SERPL-CCNC: 20 U/L (ref 10–68)
ANION GAP SERPL CALC-SCNC: 10.8 MMOL/L (ref 8–16)
BASOPHILS NFR BLD AUTO: 0.4 % (ref 0–2)
BILIRUB SERPL-MCNC: 0.34 MG/DL (ref 0.2–1.3)
BUN SERPL-MCNC: 17 MG/DL (ref 7–18)
CALCIUM SERPL-MCNC: 8.4 MG/DL (ref 8.5–10.1)
CHLORIDE SERPL-SCNC: 103 MMOL/L (ref 98–107)
CO2 SERPL-SCNC: 28.1 MMOL/L (ref 21–32)
CREAT SERPL-MCNC: 0.6 MG/DL (ref 0.6–1.3)
EOSINOPHIL NFR BLD: 1.1 % (ref 0–7)
ERYTHROCYTE [DISTWIDTH] IN BLOOD BY AUTOMATED COUNT: 12.9 % (ref 11.5–14.5)
GLOBULIN SER-MCNC: 3 G/L
GLUCOSE SERPL-MCNC: 139 MG/DL (ref 74–106)
HCT VFR BLD CALC: 36.3 % (ref 36–48)
HGB BLD-MCNC: 12.1 G/DL (ref 12–16)
IMM GRANULOCYTES NFR BLD: 0.4 % (ref 0–5)
LYMPHOCYTES NFR BLD AUTO: 21.1 % (ref 15–50)
MCH RBC QN AUTO: 29.4 PG (ref 26–34)
MCHC RBC AUTO-ENTMCNC: 33.3 G/DL (ref 31–37)
MCV RBC: 88.3 FL (ref 80–100)
MONOCYTES NFR BLD: 8.3 % (ref 2–11)
NEUTROPHILS NFR BLD AUTO: 68.7 % (ref 40–80)
OSMOLALITY SERPL CALC.SUM OF ELEC: 279 MOSM/KG (ref 275–300)
PLATELET # BLD: 240 10X3/UL (ref 130–400)
PMV BLD AUTO: 10.5 FL (ref 7.4–10.4)
POTASSIUM SERPL-SCNC: 3.9 MMOL/L (ref 3.5–5.1)
PROT SERPL-MCNC: 5.6 G/DL (ref 6.4–8.2)
RBC # BLD AUTO: 4.11 10X6/UL (ref 4–5.4)
SODIUM SERPL-SCNC: 138 MMOL/L (ref 136–145)
WBC # BLD AUTO: 10.3 10X3/UL (ref 4.8–10.8)

## 2019-03-26 NOTE — MORECARE
CASE MANAGEMENT DISCHARGE SUMMARY
 
 
PATIENT: ROSALIE HERNANDEZ                      UNIT: [id number]
ACCOUNT#: [id number]                       ADM DATE: 19
AGE: 74     : [birth year]  SEX: F            ROOM/BED: D.2218    
AUTHOR: ISAIAHDOC                             PHYSICIAN:                               
 
REFERRING PHYSICIAN: KATHY CRUM MD               
DATE OF SERVICE: 19
Discharge Plan
 
 
Patient Name: ROSALIE HERNANDEZ
Facility: [facility]:[place]
Encounter #: [id number]
Medical Record #: [id number]
: 1945
Planned Disposition: Skilled Nursing Facility
Anticipated Discharge Date: 
 
Discharge Date: 
Expected LOS: 
Initial Reviewer: [id number]
Initial Review Date: 2019
Generated: 3/26/19  12:17 pm 
Comments
 
DCP- Discharge Planning
 
Updated by [email or web address]: Mery Tafoya on 3/26/19  10:12 am CT
Patient will be discharging to [facility] to a Skilled bed. 
They will transport her via their transportation. They will pick her up at 
2:00 pm (per Nuzhat)
DCP- Discharge Planning
 
Updated by [id number]: Mery Tafoya on 3/25/19   2:03 pm CT
Patient Name: ROSALIE HERNANDEZ                                     
Admission Status: ER   
Accout number: [id number]                              
Admission Date: 2019   
: 1945                                                        
Admission Diagnosis:   
Attending: KATHY CRUM                                                
Current LOS:  3   
  
Anticipated DC Date:    
Planned Disposition: Skilled Nursing Facility   
Primary Insurance: MEDICARE A & B   
  
 
  
Discharge Planning Comments:   
  
CM met with patient to assess discharge planning needs. Patient stated that 
she lives home alone and is having issues with ambulation and using her 
wheelchair at home at this time. She has a wheelchair but now is having 
issues with the wheelchair in her home. She stated that all of her family 
lives in [place] and spoke like they are estranged. She stated that she has a ramp 
to enter in her home and can't use her wheelchair any more to enter and exit 
her home. She is current with [facility], but needs things that they 
can not give her. She talked about how she feels that she has been isolated 
in her own home.  We talked about the different levels of care and therapy. 
She would like to try skilled and LONG with [address] [place] [address] [facility]. I 
will send a referral. IMM served and explained. She has a walker, shower 
chair, and wheelchair at home. CM will continue to follow and assist with DC 
planning   
  
  
  
  
: Mery Tafoya
DCP- Discharge Planning
 
Updated by [email or web address]: Tasia Cortez on 3/23/19   9:22 pm CT
LATE ENTRY 1045  
DR CRUM SPOKE WITH CM. ADVISED PATIENT WILL NEED   
"PLACEMENT" AS SHE HAS FREQUENT FALLS , INCREASED CONFUSION AND CAN NO LONGER 
TAKE CARE OF HERSELF. CM TO FOLLOW FOR DISCHARGE PLANNING.
 DCPIA - Discharge Planning Initial Assessment
 
Updated by [id number]: Mery Tafoya on 3/25/19   2:57 pm
*  Is the patient Alert and Oriented?
Yes
*  How many steps to enter\exit or inside your home? RAMP *  PCP RADAMES *  Pharmacy [facility]
[place]  [place]
 
*  Preadmission Environment
Home Alone
*  ADLs
Partial Dependent
*  Partial ADLs (Assistance needed)
Ambulation
Transfers
*  Equipment
Shower Chair
Walker
Wheelchair
*  List name and contact numbers for known caregivers / representatives who 
currently or will assist patient after discharge:
ML CARD ([facility]) [phone]
*  Verbal permission to speak to the caregivers and representatives has been 
obtained from the patient.
 
N/A
*  Community resources currently utilized
Home Health
*  Please name any agencies selected above.
[facility]
*  Additional services required to return to the preadmission environment?
Yes
*  Can the patient safely return to the preadmission environment?
No
*  Has this patient been hospitalized within the prior 30 days at any 
hospital?
No
 
 
 
 
 
Coverage Notice
 
Reviewer: [email or web address] - Mery Tafoya
 
Notice Issued Date-Time: 2019  14:50
Notice Type: IM Discharge Notice
 
Notice Delivered To: Patient
Relationship to Patient: 
Representative Name: 
 
Delivery Method: HAND - Hand Delivered
Radha Days:
Prior Verbal Notification: 
 
Recipient Understood Notice: Yes
Recipient Signature: Yes
Med Rec Note Co-signed by Attending:
 
Coverage Notice Comment:  
 
Last DP export: 3/25/19   2:16 p
Patient Name: ROSALIE HERNANDEZ
 
Encounter #: [id number]
Page [id number]
 
 
 
 
 
Electronically Signed by GABINO COLON on 19 at 1117
 
 
 
 
 
 
**All edits/amendments must be made on the electronic document**
 
DICTATION DATE: 19 1117     : NAN  19 1117     
RPT#: [id number]                                AR DATE:        
                                               STATUS: ADM IN  
[facility]
 [place] [address]
***END OF REPORT***

## 2019-03-26 NOTE — MORECARE
CASE MANAGEMENT DISCHARGE SUMMARY
 
 
PATIENT: ROSALIE HERNANDEZ                      UNIT: [id number]
ACCOUNT#: [id number]                       ADM DATE: 19
AGE: 74     : [birth year]  SEX: F            ROOM/BED: D.2218    
AUTHOR: ISAIAHDOC                             PHYSICIAN:                               
 
REFERRING PHYSICIAN: KATHY CRUM MD               
DATE OF SERVICE: 19
Discharge Plan
 
 
Patient Name: ROSALIE HERNANDEZ
Facility: [facility]:[place]
Encounter #: [id number]
Medical Record #: [id number]
: 1945
Planned Disposition: Skilled Nursing Facility
Anticipated Discharge Date: 
 
Discharge Date: 2019
Expected LOS: 0
Initial Reviewer: [email or web address]
Initial Review Date: 2019
Generated: 3/26/19   5:09 pm 
Comments
 
DCP- Discharge Planning
 
Updated by [id number]: Mery Tafoya on 3/26/19  10:12 am CT
Patient will be discharging to [facility] to a Skilled bed. 
They will transport her via their transportation. They will pick her up at 
2:00 pm (per Nuzhat)
DCP- Discharge Planning
 
Updated by [id number]: Mery Tafoya on 3/25/19   2:03 pm CT
Patient Name: ROSALIE HERNANDEZ                                     
Admission Status: ER   
Accout number: [id number]                              
Admission Date: 2019   
: 1945                                                        
Admission Diagnosis:   
Attending: KATHY CRUM                                                
Current LOS:  3   
  
Anticipated DC Date:    
Planned Disposition: Skilled Nursing Facility   
Primary Insurance: MEDICARE A & B   
  
 
  
Discharge Planning Comments:   
  
CM met with patient to assess discharge planning needs. Patient stated that 
she lives home alone and is having issues with ambulation and using her 
wheelchair at home at this time. She has a wheelchair but now is having 
issues with the wheelchair in her home. She stated that all of her family 
lives in [place] and spoke like they are estranged. She stated that she has a ramp 
to enter in her home and can't use her wheelchair any more to enter and exit 
her home. She is current with [facility], but needs things that they 
can not give her. She talked about how she feels that she has been isolated 
in her own home.  We talked about the different levels of care and therapy. 
She would like to try skilled and LONG with [facility]. I 
will send a referral. IMM served and explained. She has a walker, shower 
chair, and wheelchair at home. CM will continue to follow and assist with DC 
planning   
  
  
  
  
: Mery Tafoya
DCP- Discharge Planning
 
Updated by [id number]: Tasia Cortez on 3/23/19   9:22 pm CT
LATE ENTRY 1045  
DR CRUM SPOKE WITH CM. ADVISED PATIENT WILL NEED   
"PLACEMENT" AS SHE HAS FREQUENT FALLS , INCREASED CONFUSION AND CAN NO LONGER 
TAKE CARE OF HERSELF. CM TO FOLLOW FOR DISCHARGE PLANNING.
 DCPIA - Discharge Planning Initial Assessment
 
Updated by [id number]: Mery Tafoya on 3/25/19   2:57 pm
*  Is the patient Alert and Oriented?
Yes
*  How many steps to enter\exit or inside your home? RAMP *  PCP RADAMES *  Pharmacy [facility]
 
*  Preadmission Environment
Home Alone
*  ADLs
Partial Dependent
*  Partial ADLs (Assistance needed)
Ambulation
Transfers
*  Equipment
Shower Chair
Walker
Wheelchair
*  List name and contact numbers for known caregivers / representatives who 
currently or will assist patient after discharge:
ML CARD ([facility]) [phone]
*  Verbal permission to speak to the caregivers and representatives has been 
obtained from the patient.
 
N/A
*  Community resources currently utilized
Home Health
*  Please name any agencies selected above.
[facility]
*  Additional services required to return to the preadmission environment?
Yes
*  Can the patient safely return to the preadmission environment?
No
*  Has this patient been hospitalized within the prior 30 days at any 
hospital?
No
 
 
 
 
 
Coverage Notice
 
Reviewer: [email or web address] - Mery Tafoya
 
Notice Issued Date-Time: 2019  14:50
Notice Type: IM Discharge Notice
 
Notice Delivered To: Patient
Relationship to Patient: 
Representative Name: 
 
Delivery Method: HAND - Hand Delivered
Radha Days:
Prior Verbal Notification: 
 
Recipient Understood Notice: Yes
Recipient Signature: Yes
Med Rec Note Co-signed by Attending:
 
Coverage Notice Comment:  
 
Last DP export: 3/26/19  10:17 a
Patient Name: ROSALIE HERNANDEZ
 
Encounter #: [id number]
Page [phone]
 
 
 
 
 
Electronically Signed by GABINO COLON on 19 at 1609
 
 
 
 
 
 
**All edits/amendments must be made on the electronic document**
 
DICTATION DATE: 03/26/19 1607     : NAN  19 1605     
RPT#: [id number]                                DC DATE:19
                                               STATUS: DIS IN  
[facility]
[address] [place] [address]
***END OF REPORT***

## 2019-03-26 NOTE — NUR
DISCUSSED DISCHARGE INSTRUCTIONS. REPORT CALLED TO VICK CHADWIKC RN. PER
PATIENT, IT HAS BEEN 5 DAYS SINCE BM. VICK SAID SHE WILL ORDER KUB BY
MOBILE XRAY WHEN PATIENT GETS TO [facility].
NO QUANTITY OR NUMBER OF DAYS PRESCRIBED FOR CIPRO. CALLED DR. PAYNE. HE IS
OUT OF OFFICE. PER DR. VIRK'S NURSE, PEGGY, [facility]
DISCHARGED VIA WHEELCHAIR BY [facility] TRANSPORT STAFF.

## 2019-03-26 NOTE — NUR
JARED HICKMAN FROM DR. CRUM'S OFFICE RETURNED CALL. CIPRO IS PRESCRIBED 500 MG
Q12 X 7 DAYS QUANTITY 14 STARTING TONIGHT. REPORTED TO JARED HICKMAN AT
[facility].

## 2019-03-26 NOTE — NUR
C/O RT UPPER ABDOMEN CRAMPING STATES HASN'T HAD A BM IN A FEW DAYS. REQUESTING
TYLENOL FOR CRAMPING. TYLENOL 650MG PO GIVEN FOR CRAMPS. UP WITH HELP TO [facility]
VOIDED WELL. ASSISTED BACK TO BED SR UP X2 CALL LIGHT WITHIN REACH PRUNEJUICE
WITH DIET LEMONLIME GIVEN PO FOR CONSTIPATION.

## 2019-06-17 ENCOUNTER — HOSPITAL ENCOUNTER (EMERGENCY)
Dept: HOSPITAL 84 - D.ER | Age: 74
Discharge: HOME | End: 2019-06-17
Payer: MEDICARE

## 2019-06-17 VITALS — DIASTOLIC BLOOD PRESSURE: 76 MMHG | SYSTOLIC BLOOD PRESSURE: 146 MMHG

## 2019-06-17 VITALS — HEIGHT: 69 IN | BODY MASS INDEX: 26.72 KG/M2 | WEIGHT: 180.38 LBS

## 2019-06-17 DIAGNOSIS — N39.0: ICD-10-CM

## 2019-06-17 DIAGNOSIS — K52.9: Primary | ICD-10-CM

## 2019-06-17 DIAGNOSIS — E86.0: ICD-10-CM

## 2019-06-17 LAB
ALBUMIN SERPL-MCNC: 3.4 G/DL (ref 3.4–5)
ALP SERPL-CCNC: 99 U/L (ref 46–116)
ALT SERPL-CCNC: 29 U/L (ref 10–68)
AMORPHOUS SEDIMENT: (no result) /LPF
AMYLASE SERPL-CCNC: 45 U/L (ref 25–115)
ANION GAP SERPL CALC-SCNC: 11.8 MMOL/L (ref 8–16)
APPEARANCE UR: (no result)
BACTERIA #/AREA URNS HPF: (no result) /HPF
BASOPHILS NFR BLD AUTO: 0.3 % (ref 0–2)
BILIRUB SERPL-MCNC: 0.3 MG/DL (ref 0.2–1.3)
BILIRUB SERPL-MCNC: NEGATIVE MG/DL
BUN SERPL-MCNC: 20 MG/DL (ref 7–18)
CALCIUM SERPL-MCNC: 9.4 MG/DL (ref 8.5–10.1)
CHLORIDE SERPL-SCNC: 96 MMOL/L (ref 98–107)
CO2 SERPL-SCNC: 27.8 MMOL/L (ref 21–32)
COLOR UR: (no result)
CREAT SERPL-MCNC: 0.8 MG/DL (ref 0.6–1.3)
EOSINOPHIL NFR BLD: 0.4 % (ref 0–7)
ERYTHROCYTE [DISTWIDTH] IN BLOOD BY AUTOMATED COUNT: 12.9 % (ref 11.5–14.5)
GLOBULIN SER-MCNC: 3.8 G/L
GLUCOSE SERPL-MCNC: 243 MG/DL (ref 74–106)
GLUCOSE SERPL-MCNC: 250 MG/DL
HCT VFR BLD CALC: 41 % (ref 36–48)
HGB BLD-MCNC: 14.4 G/DL (ref 12–16)
IMM GRANULOCYTES NFR BLD: 0.2 % (ref 0–5)
KETONES UR STRIP-MCNC: NEGATIVE MG/DL
LIPASE SERPL-CCNC: 95 U/L (ref 73–393)
LYMPHOCYTES NFR BLD AUTO: 18.7 % (ref 15–50)
MCH RBC QN AUTO: 29.4 PG (ref 26–34)
MCHC RBC AUTO-ENTMCNC: 35.1 G/DL (ref 31–37)
MCV RBC: 83.7 FL (ref 80–100)
MONOCYTES NFR BLD: 7.9 % (ref 2–11)
NEUTROPHILS NFR BLD AUTO: 72.5 % (ref 40–80)
NITRITE UR-MCNC: NEGATIVE MG/ML
OSMOLALITY SERPL CALC.SUM OF ELEC: 273 MOSM/KG (ref 275–300)
PH UR STRIP: 5 [PH] (ref 5–6)
PLATELET # BLD: 249 10X3/UL (ref 130–400)
PMV BLD AUTO: 9.7 FL (ref 7.4–10.4)
POTASSIUM SERPL-SCNC: 4.6 MMOL/L (ref 3.5–5.1)
PROT SERPL-MCNC: 7.2 G/DL (ref 6.4–8.2)
PROT UR-MCNC: NEGATIVE MG/DL
RBC # BLD AUTO: 4.9 10X6/UL (ref 4–5.4)
RBC #/AREA URNS HPF: (no result) /HPF (ref 0–5)
SODIUM SERPL-SCNC: 131 MMOL/L (ref 136–145)
SP GR UR STRIP: 1.01 (ref 1–1.02)
SQUAMOUS #/AREA URNS HPF: (no result) /HPF (ref 0–5)
TROPONIN I SERPL-MCNC: < 0.017 NG/ML (ref 0–0.06)
UROBILINOGEN UR-MCNC: NORMAL MG/DL
WBC # BLD AUTO: 10.1 10X3/UL (ref 4.8–10.8)
WBC #/AREA URNS HPF: (no result) /HPF (ref 0–5)

## 2019-06-24 ENCOUNTER — HOSPITAL ENCOUNTER (EMERGENCY)
Dept: HOSPITAL 84 - D.ER | Age: 74
Discharge: HOME | End: 2019-06-24
Payer: MEDICARE

## 2019-06-24 VITALS — SYSTOLIC BLOOD PRESSURE: 163 MMHG | DIASTOLIC BLOOD PRESSURE: 82 MMHG

## 2019-06-24 VITALS
BODY MASS INDEX: 27.46 KG/M2 | WEIGHT: 185.39 LBS | HEIGHT: 69 IN | HEIGHT: 69 IN | BODY MASS INDEX: 27.46 KG/M2 | WEIGHT: 185.39 LBS
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
## 2019-06-24 DIAGNOSIS — S91.115A: ICD-10-CM

## 2019-06-24 DIAGNOSIS — E11.621: Primary | ICD-10-CM

## 2019-06-24 DIAGNOSIS — I10: ICD-10-CM

## 2019-06-24 LAB
ALBUMIN SERPL-MCNC: 3 G/DL (ref 3.4–5)
ALP SERPL-CCNC: 107 U/L (ref 46–116)
ALT SERPL-CCNC: 40 U/L (ref 10–68)
ANION GAP SERPL CALC-SCNC: 8.3 MMOL/L (ref 8–16)
BASOPHILS NFR BLD AUTO: 0.3 % (ref 0–2)
BILIRUB SERPL-MCNC: 0.36 MG/DL (ref 0.2–1.3)
BUN SERPL-MCNC: 20 MG/DL (ref 7–18)
CALCIUM SERPL-MCNC: 8.7 MG/DL (ref 8.5–10.1)
CHLORIDE SERPL-SCNC: 98 MMOL/L (ref 98–107)
CO2 SERPL-SCNC: 29.6 MMOL/L (ref 21–32)
CREAT SERPL-MCNC: 0.7 MG/DL (ref 0.6–1.3)
EOSINOPHIL NFR BLD: 1.5 % (ref 0–7)
ERYTHROCYTE [DISTWIDTH] IN BLOOD BY AUTOMATED COUNT: 12.9 % (ref 11.5–14.5)
GLOBULIN SER-MCNC: 3.4 G/L
GLUCOSE SERPL-MCNC: 298 MG/DL (ref 74–106)
HCT VFR BLD CALC: 37.6 % (ref 36–48)
HGB BLD-MCNC: 13.1 G/DL (ref 12–16)
IMM GRANULOCYTES NFR BLD: 0.2 % (ref 0–5)
LYMPHOCYTES NFR BLD AUTO: 16.4 % (ref 15–50)
MCH RBC QN AUTO: 29.5 PG (ref 26–34)
MCHC RBC AUTO-ENTMCNC: 34.8 G/DL (ref 31–37)
MCV RBC: 84.7 FL (ref 80–100)
MONOCYTES NFR BLD: 7.3 % (ref 2–11)
NEUTROPHILS NFR BLD AUTO: 74.3 % (ref 40–80)
OSMOLALITY SERPL CALC.SUM OF ELEC: 278 MOSM/KG (ref 275–300)
PLATELET # BLD: 250 10X3/UL (ref 130–400)
PMV BLD AUTO: 9.9 FL (ref 7.4–10.4)
POTASSIUM SERPL-SCNC: 3.9 MMOL/L (ref 3.5–5.1)
PROT SERPL-MCNC: 6.4 G/DL (ref 6.4–8.2)
RBC # BLD AUTO: 4.44 10X6/UL (ref 4–5.4)
SODIUM SERPL-SCNC: 132 MMOL/L (ref 136–145)
WBC # BLD AUTO: 11.4 10X3/UL (ref 4.8–10.8)

## 2019-07-14 ENCOUNTER — HOSPITAL ENCOUNTER (INPATIENT)
Dept: HOSPITAL 84 - D.ER | Age: 74
LOS: 22 days | Discharge: NURSING, REHAB OR LONG-TERM CARE | DRG: 876 | End: 2019-08-05
Attending: PSYCHIATRY & NEUROLOGY | Admitting: PSYCHIATRY & NEUROLOGY
Payer: MEDICARE

## 2019-07-14 VITALS — DIASTOLIC BLOOD PRESSURE: 90 MMHG | SYSTOLIC BLOOD PRESSURE: 145 MMHG

## 2019-07-14 VITALS
BODY MASS INDEX: 26.33 KG/M2 | WEIGHT: 177.77 LBS | BODY MASS INDEX: 26.33 KG/M2 | WEIGHT: 177.77 LBS | BODY MASS INDEX: 26.33 KG/M2 | BODY MASS INDEX: 26.33 KG/M2 | HEIGHT: 69 IN | HEIGHT: 69 IN

## 2019-07-14 VITALS — DIASTOLIC BLOOD PRESSURE: 80 MMHG | SYSTOLIC BLOOD PRESSURE: 162 MMHG

## 2019-07-14 DIAGNOSIS — E03.9: ICD-10-CM

## 2019-07-14 DIAGNOSIS — E55.9: ICD-10-CM

## 2019-07-14 DIAGNOSIS — L97.512: ICD-10-CM

## 2019-07-14 DIAGNOSIS — I73.9: ICD-10-CM

## 2019-07-14 DIAGNOSIS — E11.40: ICD-10-CM

## 2019-07-14 DIAGNOSIS — L97.322: ICD-10-CM

## 2019-07-14 DIAGNOSIS — I10: ICD-10-CM

## 2019-07-14 DIAGNOSIS — E87.1: ICD-10-CM

## 2019-07-14 DIAGNOSIS — M19.90: ICD-10-CM

## 2019-07-14 DIAGNOSIS — F33.1: Primary | ICD-10-CM

## 2019-07-14 DIAGNOSIS — Z79.4: ICD-10-CM

## 2019-07-14 DIAGNOSIS — G30.1: ICD-10-CM

## 2019-07-14 DIAGNOSIS — L03.115: ICD-10-CM

## 2019-07-14 DIAGNOSIS — R19.7: ICD-10-CM

## 2019-07-14 DIAGNOSIS — F02.81: ICD-10-CM

## 2019-07-14 DIAGNOSIS — E83.42: ICD-10-CM

## 2019-07-14 DIAGNOSIS — M06.9: ICD-10-CM

## 2019-07-14 LAB
ALBUMIN SERPL-MCNC: 2.8 G/DL (ref 3.4–5)
ALP SERPL-CCNC: 122 U/L (ref 46–116)
ALT SERPL-CCNC: 28 U/L (ref 10–68)
AMPHETAMINES UR QL SCN: NEGATIVE QUAL
ANION GAP SERPL CALC-SCNC: 7.9 MMOL/L (ref 8–16)
APAP SERPL-MCNC: 0.8 UG/ML (ref 10–30)
APPEARANCE UR: CLEAR
BACTERIA #/AREA URNS HPF: (no result) /HPF
BARBITURATES UR QL SCN: NEGATIVE QUAL
BASOPHILS NFR BLD AUTO: 0.4 % (ref 0–2)
BENZODIAZ UR QL SCN: NEGATIVE QUAL
BILIRUB SERPL-MCNC: 0.52 MG/DL (ref 0.2–1.3)
BILIRUB SERPL-MCNC: NEGATIVE MG/DL
BUN SERPL-MCNC: 17 MG/DL (ref 7–18)
BZE UR QL SCN: NEGATIVE QUAL
CALCIUM SERPL-MCNC: 8.8 MG/DL (ref 8.5–10.1)
CANNABINOIDS UR QL SCN: NEGATIVE QUAL
CHLORIDE SERPL-SCNC: 92 MMOL/L (ref 98–107)
CO2 SERPL-SCNC: 30.3 MMOL/L (ref 21–32)
COLOR UR: YELLOW
CREAT SERPL-MCNC: 0.7 MG/DL (ref 0.6–1.3)
EOSINOPHIL NFR BLD: 1.2 % (ref 0–7)
ERYTHROCYTE [DISTWIDTH] IN BLOOD BY AUTOMATED COUNT: 12.8 % (ref 11.5–14.5)
ETHANOL SERPL-MCNC: 1 MG/DL (ref 0–10)
GLOBULIN SER-MCNC: 4.2 G/L
GLUCOSE SERPL-MCNC: 1000 MG/DL
GLUCOSE SERPL-MCNC: 328 MG/DL (ref 74–106)
HCT VFR BLD CALC: 37.3 % (ref 36–48)
HGB BLD-MCNC: 13 G/DL (ref 12–16)
IMM GRANULOCYTES NFR BLD: 0.2 % (ref 0–5)
KETONES SERPL-MCNC: NEGATIVE MG/DL
KETONES UR STRIP-MCNC: NEGATIVE MG/DL
LYMPHOCYTES NFR BLD AUTO: 16.9 % (ref 15–50)
MAGNESIUM SERPL-MCNC: 1.7 MG/DL (ref 1.8–2.4)
MCH RBC QN AUTO: 29.4 PG (ref 26–34)
MCHC RBC AUTO-ENTMCNC: 34.9 G/DL (ref 31–37)
MCV RBC: 84.4 FL (ref 80–100)
MONOCYTES NFR BLD: 7.7 % (ref 2–11)
NEUTROPHILS NFR BLD AUTO: 73.6 % (ref 40–80)
NITRITE UR-MCNC: NEGATIVE MG/ML
OPIATES UR QL SCN: NEGATIVE QUAL
OSMOLALITY SERPL CALC.SUM OF ELEC: 267 MOSM/KG (ref 275–300)
PCP UR QL SCN: NEGATIVE QUAL
PH UR STRIP: 5 [PH] (ref 5–6)
PLATELET # BLD: 304 10X3/UL (ref 130–400)
PMV BLD AUTO: 9.7 FL (ref 7.4–10.4)
POTASSIUM SERPL-SCNC: 4.2 MMOL/L (ref 3.5–5.1)
PROT SERPL-MCNC: 7 G/DL (ref 6.4–8.2)
PROT UR-MCNC: NEGATIVE MG/DL
RBC # BLD AUTO: 4.42 10X6/UL (ref 4–5.4)
RBC #/AREA URNS HPF: (no result) /HPF (ref 0–5)
SODIUM SERPL-SCNC: 126 MMOL/L (ref 136–145)
SP GR UR STRIP: 1.01 (ref 1–1.02)
SQUAMOUS #/AREA URNS HPF: (no result) /HPF (ref 0–5)
UROBILINOGEN UR-MCNC: NORMAL MG/DL
WBC # BLD AUTO: 12 10X3/UL (ref 4.8–10.8)
WBC #/AREA URNS HPF: (no result) /HPF (ref 0–5)

## 2019-07-14 NOTE — NUR
HOSPICE NURSE TYREL HERE.  STATES RESIDENT WAS ALSO PEEING ON OTHER RESIDEN'TS
BED ALONG WITH STANDING OVER THEIR BED AND ACTING LIKE SHE WAS GOING TO HIT
THEM.  [facility] THEY ARE WANTING HER PLACED IN SENIOR CARE FOR BEHAVIORAL.

## 2019-07-14 NOTE — NUR
NEW ADMIT TO DOCTOR WINN FROM [place] EMERGENC[facility] DEPARTMENT. PT IS A
RESIDENT OF [facility]. ADMIT RELATED TO AGGRESIVE ACTES WITH A PEER AND
URINATING ON A PEERS BED. CALM AND COOPERATIVE WITH ADMIT ASSESSMENT. PATIENT
IS HER OWN POA. SIGNED ADMIT CONSENTS. CODE WORD OF GUZMAN GIVEN. PATIENT
STATES SHE IS A DNR. STATES SHE HAS NO FAMILY TO NOTIFY OF ADMIT.

## 2019-07-14 NOTE — NUR
PT INCONTINENT OF URINE AND IN DIAPER.  IN AND OUT CATH PER STERILE TECHNIQUE. 
CLEAR YELLOW URINE OBTAINED.  HAS IV IN PLACE VIA EMS.  NS 500CC BOLUS OF NS
THAT WAS STARTED EN ROUTE IS COMPLETE.

## 2019-07-14 NOTE — NUR
PT TO 21 VIA EMS FROM THE [facility].  PT UNSURE OF WHY SHE IS HERE. 
EMS TOLD [facility]FF THAT SHE WAS FOUND STANDING OVER ANOTHER RESIDENTS BED WITH
A PIECE OF A W/C IN A THREATENING MANNER.  PT HAS NO RECOLLECTION OF SAID
EVENTS.  STATES IS DIABETIC, BUT HASN'T BEEN RECEIVEING HER MEDS FOR HER BLOOD
SUGAR AT THE [facility].

## 2019-07-15 VITALS — SYSTOLIC BLOOD PRESSURE: 138 MMHG | DIASTOLIC BLOOD PRESSURE: 72 MMHG

## 2019-07-15 VITALS — DIASTOLIC BLOOD PRESSURE: 75 MMHG | SYSTOLIC BLOOD PRESSURE: 155 MMHG

## 2019-07-15 LAB
CHOLEST/HDLC SERPL: 3.7 RATIO (ref 2.3–4.1)
EST. AVERAGE GLUCOSE BLD GHB EST-MCNC: 220 MG/DL (ref 74–154)
HDLC SERPL-MCNC: 47 MG/DL (ref 32–96)
LDL-HDL RATIO: 2.4 RATIO (ref 1.5–3.5)
LDLC SERPL-MCNC: 114 MG/DL (ref 0–100)
TRIGL SERPL-MCNC: 79 MG/DL (ref 30–200)
TSH SERPL-ACNC: 4.49 UIU/ML (ref 0.36–3.74)

## 2019-07-15 NOTE — NUR
IS ORIENTED TO SELF AND HOSPITAL.IS UNABLE TO STAND AND REQUIRES TOTAL LIFT
PER 2 TO TRANSFER.CAN BE VERY DEMEANING AND HATEFUL TO OTHERS.TOLD THIS NURSE
TO GET MY HANDS OFF FROM CLOSE TO HER CROTCH.THIS NURSE DIDNOT HAVE HER HANDS
ON HER.WILL CONTINUE WITH PLAN OF CARE,MONITOR FOR CHANGES AND SAFETY.WOUND
CARE CONSULT WAS ORDERED TODAY FOR ULCERS ON FEET.

## 2019-07-15 NOTE — NUR
B.) Patient is alert and oriented to self and situation only.
I.) Provided PM medications.
R.) Compliant with medications.
P.) Continue Plan of Care.

## 2019-07-16 VITALS — SYSTOLIC BLOOD PRESSURE: 152 MMHG | DIASTOLIC BLOOD PRESSURE: 73 MMHG

## 2019-07-16 VITALS — SYSTOLIC BLOOD PRESSURE: 103 MMHG | DIASTOLIC BLOOD PRESSURE: 55 MMHG

## 2019-07-16 NOTE — NUR
RECEIVED IN HALLWAY OUTSIDE OF NURSES STATION. SITTING IN A RECLINING CHAIR.
CALM AND COOPERATIVE WITH CARE AND ASSESSMENT. NO SIGNS OF AGGRESSION.
REDIRECT AND REORIENT AS NEEDED. RESTING IN BED EYES CLOSED AT THIS TIME.
CONTINUE PLAN OF CARE

## 2019-07-16 NOTE — NUR
PT IS AWAKE AND ALERT. CALM AND COOPERATIVE WITH ASSESSMENT. MED COMPLIANT. PT
CAN BE VERY DEMANDING WITH STAFF AT TIMES. REDIRECT AND REORIENT AS NEEDED.
FALL PRECAUTIONS IN PLACE. WILL CPOC.

## 2019-07-16 NOTE — PSY
PATIENT NAME:ROSALIE HERNANDEZ                                MEDICAL RECORD: [id number]
: [birth year]                                              LOCATION:[facility]
ADMISSION DATE: 19    ACCOUNT: [id number]
                                                           
PSYCHIATRIC EVALUATION
 
 
DATE OF EVALUATION: 07/15/19
 
 
PSYCHIATRIC EVALUATION
 
IDENTIFYING DATA:  The patient is 74 years old and she is admitted to the
hospital on a voluntary basis secondary to aggression.
 
CHIEF COMPLAINT:  None.
 
HISTORY OF PRESENT ILLNESS:  The patient lives in a local nursing home.  She
urinated in another patient's bed.  She says she did this because the staff
would not take her to the bathroom.  She also reportedly took the leg off of a
wheelchair and was brandishing it about in a threatening way, although she
denies this.  She tells me she has never been here before and has never met me,
although she was a patient here in February of this year, which was only 5
months ago.  She at that time was here for depression.  The patient does endorse
numerous neurovegetative depressive symptoms.  She is denying any psychotic
symptoms.
 
PAST MEDICAL HISTORY:  Significant for hypothyroidism, diabetes, osteoarthritis,
and chronic back pain.
 
PAST PSYCHIATRIC HISTORY:  Significant for a lifelong problem of psychiatric
problems or issues and these have been treated on an outpatient basis.  The
psychiatric hospitalization here in February of this year was her first
hospitalization.  She unfortunately has a long history of using both wine and
marijuana.  She minimizes the amount.
 
ALLERGIES:  No known drug allergies.
 
CURRENT MEDICATIONS:  Include metoprolol, Ativan, insulin, Celebrex, gabapentin,
Synthroid, and Altace.
 
SOCIAL HISTORY:  The patient was an occupational therapist and worked in a
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  She has been retired for several years now and has pretty
limited social support.  She never  and has no children.  She does have 2
nieces who live in [place].
 
MENTAL STATUS EXAMINATION:  The patient is awake; alert; and oriented to person
and place, but not to time or situation.  She knows the year, but is mistaken
about the month.  Her mood is flat.  Her affect is constricted.  Thought
processes are circumstantial.  Her memory, concentration, and abstraction
abilities are impaired.  She denies any active intent to harm herself or others.
 She denies psychotic symptoms.
 
ASSETS:  Supportive family members.
 
LIABILITIES:  Limited insight.
 
DIAGNOSTIC IMPRESSION:
 
AXIS I:
1.  Major depression, moderate severity, recurrent, without psychotic features.
2.  Senile dementia of the Alzheimer's type.
AXIS II:  Cluster B personality traits.
AXIS III:  Hypertension, rheumatoid arthritis, and peripheral neuropathy.
AXIS IV:  Moderate.
AXIS V:  Global assessment of functioning is 30.
 
PLAN:  At this time, the patient is admitted to the hospital secondary to
confused and aggressive behavior at the nursing home.  She will be treated with
memory enhancing and mood stabilizing medications.  Her long-term prognosis is
guarded.
 
TRANSINT:[id number] Voice Confirmation ID: [id number] DOCUMENT ID: [id number]
                                           
                                           SILVIANO WINN MD             
 
 
 
Electronically Signed by SILVIANO WINN on 19 at 1454
 
 
 
 
 
 
 
 
 
 
 
 
 
 
 
 
 
 
 
 
 
 
 
 
 
 
 
 
 
 
 
 
CC:                                                             [id number]
DICTATION DATE: 07/15/19 2015     :     07/15/19 2037      ADM IN  
                                                                              
[address] [place] [address]

## 2019-07-16 NOTE — NUR
PATIENTS RIGHT PINKY TOE WAS OPEN AND DRAINING SEROUS FLUID. PATIENT DENIES
PAIN. SHE CANT RECALL HOW IT OCCURED OR FOR HOW LONG ITS BEEN LIKE THAT.
WRAPPED IN NON-ADHESIVE BANDAGE AND GAUZE WOUND CONSULT ORDERED PER MD.

## 2019-07-17 VITALS — DIASTOLIC BLOOD PRESSURE: 64 MMHG | SYSTOLIC BLOOD PRESSURE: 133 MMHG

## 2019-07-17 VITALS — DIASTOLIC BLOOD PRESSURE: 61 MMHG | SYSTOLIC BLOOD PRESSURE: 115 MMHG

## 2019-07-17 NOTE — NUR
RECEIVED PT IN DINING ROOM FOR B'FAST, ALERT, CALM, COOPERATIVE.  HOWEVER,
TENDS TO NOT ASSIST WITH TRANSFERS FROM W/C TO TOILET OR BED.  MEDS ADMIN PER
ORDERS WITH COMPLETE MED COMPLIANCE NOTED.  CONT POC AS DIRECTED.

## 2019-07-18 VITALS — SYSTOLIC BLOOD PRESSURE: 144 MMHG | DIASTOLIC BLOOD PRESSURE: 67 MMHG

## 2019-07-18 NOTE — NUR
Treatment team review
Nutrition follow-up:
Diet: ADA
PO intake ~85% of last 9 meals
Labs reviewed
Wt: 173# -> admit wt of 179# was a stated wt.
+BM
RDN following.

## 2019-07-18 NOTE — NUR
PATIENT SITTING IN RECLINER CHAIR. RESP EVEN AND NONLABORED. NO ACUTE DITSRESS
NOTED. PT IS PLESANT WITH STAFF DOES NOT SOCIALIZIE MUCH WITH PEERS. CONFUSION
NOTED. REDIRECT AND REORIENT AS NEEDED. CHAIR ALARM IN PLACE AND ACTIVE.
MEDICATION COMPLIANT. ASSIST WITH TOILETING. CONT WITH ORDERS TO CHANGE
BANDAGES TO BILATERAL FEET PER DR. ORDERS. WILL CONT PLAN OF CARE.

## 2019-07-18 NOTE — PN
PATIENT:ROSALIE HERNANDEZ                            MEDICAL RECORD: [id number]
                                                         [facility]
                                                         ADMISSION DATE: 07/14/19
 
PROGRESS NOTE
 
 
DATE OF SERVICE:  07/17/2019
 
SUBJECTIVE:  The patient's case was discussed with staff.  She has no new
complaint.
 
OBJECTIVE:  The patient is in good behavioral control.  She has tolerated her
increase in gabapentin well.  She will be monitored for clinical changes
associated with its use.
 
TRANSINT:[id number] Voice Confirmation ID: [id number] DOCUMENT ID: [id number]
 
 
 
 
                                           
                                           SILVIANO WINN MD             
 
 
 
Electronically Signed by SILVIANO WINN on 07/18/19 at 1345
 
 
 
 
 
 
 
 
 
 
 
 
 
 
 
 
 
 
 
 
 
 
 
 
CC:                                                             [id number]
DICTATION DATE: 07/17/19 1542     :     07/17/19 1843      ADM IN  
                                                                              
[id number] [place] [id number]

## 2019-07-19 VITALS — DIASTOLIC BLOOD PRESSURE: 64 MMHG | SYSTOLIC BLOOD PRESSURE: 132 MMHG

## 2019-07-19 VITALS — SYSTOLIC BLOOD PRESSURE: 160 MMHG | DIASTOLIC BLOOD PRESSURE: 78 MMHG

## 2019-07-19 VITALS — SYSTOLIC BLOOD PRESSURE: 167 MMHG | DIASTOLIC BLOOD PRESSURE: 76 MMHG

## 2019-07-19 NOTE — PN
PATIENT:ROSALIE HERNANDEZ                            MEDICAL RECORD: [id number]
                                                         LOCATION:[facility]
                                                         ADMISSION DATE: 07/14/19
 
PROGRESS NOTE
 
 
DATE OF SERVICE:  07/18/2019
 
SUBJECTIVE:  The patient's case was discussed with staff.  She has no new
complaint.
 
OBJECTIVE:  The patient denies intent to harm herself or others.  She is clearly
quite confused at times, she is fairly manipulative.
 
ASSESSMENT:  No change in diagnoses.
 
PLAN:  I am going to taper the patient's scheduled dose of Ativan that she came
to us on.  I would like to manage her without a benzodiazepine if at all
possible.  Hopefully, I can taper her off of this successfully.  Right now, she
has not been aggressive and her mood is still depressed, but she is not having
any suicidal thoughts.
 
TRANSINT:[id number] Voice Confirmation ID: [id number] DOCUMENT ID: [id number]
 
 
 
 
                                           
                                           SILVIANO WINN MD             
 
 
 
Electronically Signed by SILVIANO WINN on 07/19/19 at 1551
 
 
 
 
 
 
 
 
 
 
 
 
 
 
 
 
 
CC:                                                             [id number]
DICTATION DATE: 07/18/19 1444     :     07/18/19 1505      ADM IN  
                                                                              
[facility]                                          
[address] [place] [address]

## 2019-07-19 NOTE — NUR
PATIENT SITTING IN CHAIR IN DINING AREA. NO ACUTE DISTRESS NOTED. PT CAN BE
DEMANDING AT TIMES. CONFUSED. REDIRECT AND REORIENT AS NEEDED. PT IS
INCONTIENT. REQUIRES 2X ASSISTANCE WITH SIT TO STAND LIFT. PT IS PLESANT WITH
STAFF AND PEERS. MEDICATION COMPLIANT. CHAIR ALARM IN PLACE AND ACTIVE. WILL
CONT TO PLAN OF CARE.

## 2019-07-20 VITALS — SYSTOLIC BLOOD PRESSURE: 153 MMHG | DIASTOLIC BLOOD PRESSURE: 77 MMHG

## 2019-07-20 VITALS — DIASTOLIC BLOOD PRESSURE: 74 MMHG | SYSTOLIC BLOOD PRESSURE: 167 MMHG

## 2019-07-20 NOTE — PN
PATIENT:ROSALIE HERNANDEZ                            MEDICAL RECORD: [id number]
                                                         [facility]
                                                         ADMISSION DATE: 07/14/19
 
PROGRESS NOTE
 
 
DATE OF SERVICE:  07/19/2019
 
SUBJECTIVE:  The patient's case was discussed with staff.  She has no new
complaint.
 
OBJECTIVE:  The patient is eating and sleeping well.  She still has almost no
insight about her situation and continues to have little or no insight about her
behaviors.
 
ASSESSMENT:  No change in diagnoses.
 
PLAN:  Current medicines will be maintained.  Long-term prognosis guarded.
 
TRANSINT:[id number] Voice Confirmation ID: [id number] DOCUMENT ID: [id number]
 
 
 
 
                                           
                                           SILVIANO WINN MD             
 
 
 
Electronically Signed by SILVIANO WINN on 07/20/19 at 1156
 
 
 
 
 
 
 
 
 
 
 
 
 
 
 
 
 
 
 
 
CC:                                                             [id number]
DICTATION DATE: 07/19/19 1715     :     07/19/19 2113      ADM IN  
                                                                              
[address] [place] [address]

## 2019-07-20 NOTE — NUR
B) Patient is alert and oriented to person and place, calm and cooperative,
I) Administered scheduled medications as ordered, monitored for needs,
R) Mediation compliant, sleeping quietly now,
P) Continue plan of care.

## 2019-07-20 NOTE — NUR
B.) Patient is alert and calm.
I.) Provided PM medications and assessed for needs.
R.) Compliant with all medications and refused needs at this time.
P.) Continue Plan of Care

## 2019-07-20 NOTE — NUR
RECEIVED PATIENT IN DINING ROOM FOR B'FAST, ALERT, CALM, COOPERATIVE.
MEDS ADMIN PER ORDERS WITH COMPLETE COMPLIANCE NOTED.
COOPERATIVE WITH STAFF, HOWEVER REQUESTS A CONSIDERABLE AMOUNT OF ASSISTANCE
FROM STAFF.  CONT POC AS ORDERED, ENCOURAGING INCREASED INDEPENDENCE.

## 2019-07-21 VITALS — DIASTOLIC BLOOD PRESSURE: 95 MMHG | SYSTOLIC BLOOD PRESSURE: 192 MMHG

## 2019-07-21 VITALS — DIASTOLIC BLOOD PRESSURE: 67 MMHG | SYSTOLIC BLOOD PRESSURE: 148 MMHG

## 2019-07-21 NOTE — NUR
RECEIVED IN DAYROOM. SITTING AT THE TABLE BY HERSELF. CALM AND COOPERATIVE
WITH CARE AND ASSESSMENT. NO SIGNS OF AGGRESSION. REDIRECT AND REORIENT AS
NEEDED. RESTING IN BED WITH EYES CLOSED AT THIS TIME. CONTINUE PLAN OF CARE

## 2019-07-21 NOTE — PN
PATIENT:ROSALIE HERNANDEZ                            MEDICAL RECORD: [id number]
                                                         [facility]ATION:[facility]
                                                         ADMISSION DATE: 07/14/19
 
PROGRESS NOTE
 
 
DATE OF SERVICE:  07/20/2019
 
SUBJECTIVE:  The patient's case was discussed with staff.  She has no new
complaint.
 
OBJECTIVE:  The patient is in good behavioral control.  She has a depressed
mood, but no thoughts of harming herself or others.  She is impaired
cognitively, but is able to recall events from this morning when she visited
with some of the staff members.
 
ASSESSMENT:  No change in diagnoses.
 
PLAN:  Current medicines have been reviewed and will be maintained.  Long-term
prognosis is guarded.
 
TRANSINT:[id number] Voice Confirmation ID: [id number] DOCUMENT ID: [id number]
 
 
 
 
                                           
                                           SILVIANO WINN MD             
 
 
 
Electronically Signed by SILVIANO WINN on 07/21/19 at 1141
 
 
 
 
 
 
 
 
 
 
 
 
 
 
 
 
 
 
CC:                                                             [id number]
DICTATION DATE: 07/20/19 1241     :     07/20/19 1310      ADM IN  
                                                                              
[address] [place] [address]

## 2019-07-21 NOTE — NUR
RECEIVED PT. IN DINING ROOM FOR B'FAST, ALERT, CALM, COOPERATIVE, NO
BEHAVIORAL ISSUES NOTED.  MEDS ADMIN PER ORDERS WITH COMPLETE MED COMPLIANCE
NOTED.  COOPERATIVE WITH ALL ASPECTS OF PLAN OF CARE.  CONT POC AS DIRECTED.

## 2019-07-22 VITALS — DIASTOLIC BLOOD PRESSURE: 75 MMHG | SYSTOLIC BLOOD PRESSURE: 119 MMHG

## 2019-07-22 VITALS — DIASTOLIC BLOOD PRESSURE: 63 MMHG | SYSTOLIC BLOOD PRESSURE: 141 MMHG

## 2019-07-22 NOTE — NUR
RECEIVED IN HALLWAY. SITTING IN A RECLINER WITH PEERS AT HER SIDE. SOCIALIZING
AT TIMES. GIVING ENCOURAGEMENT TO HER PEERS. CALM AND COOPERATIVE WITH CARE
AND ASSESSMENT. NO SIGNS OF AGGRESSION. RESTING IN BED WITH EYES CLOSED AT
THIS TIME. CONTINUE PLAN OF CARE

## 2019-07-22 NOTE — NUR
RECEIVED PATIENT IN DINING ROOM FOR B'FAST, ALERT, CALM, COOPERATVIE,
DEMANDING.  MEDS ADMIN PER ORDERS WITH COMPLETE MED COMPLIANCE NOTED.
COOPERATIVE WITH PLAN OF CARE.  CONT POC INCLUDING MEDS AND GROUP THERAPY.

## 2019-07-22 NOTE — PN
PATIENT:ROSALIE HERNANDEZ                            MEDICAL RECORD: [id number]
                                                         LOCATION:[facility]
                                                         ADMISSION DATE: 07/14/19
 
PROGRESS NOTE
 
 
DATE OF SERVICE:  07/21/2019
 
SUBJECTIVE:  The patient's case was discussed with staff.  She has no new
complaint.
 
OBJECTIVE:  The patient denies intent to harm herself or others.  She does
tolerate her medicines well.
 
ASSESSMENT:  No change in diagnoses.
 
PLAN:  Current medicines have been reviewed and will be maintained.  Supportive
and educational interventions were provided.
 
TRANSINT:[id number] Voice Confirmation ID: [id number] DOCUMENT ID: [id number]
 
 
 
 
                                           
                                           SILVIANO WINN MD             
 
 
 
Electronically Signed by SILVIANO WINN on 07/22/19 at 1534
 
 
 
 
 
 
 
 
 
 
 
 
 
 
 
 
 
 
 
 
CC:                                                             [id number]
DICTATION DATE: 07/21/19 1157     :     07/21/19 1512      ADM IN  
                                                                              
[id number] [place] [id number]

## 2019-07-23 VITALS — DIASTOLIC BLOOD PRESSURE: 93 MMHG | SYSTOLIC BLOOD PRESSURE: 173 MMHG

## 2019-07-23 VITALS — DIASTOLIC BLOOD PRESSURE: 74 MMHG | SYSTOLIC BLOOD PRESSURE: 99 MMHG

## 2019-07-23 NOTE — PN
PATIENT:ROSALIE HERNANDEZ                            MEDICAL RECORD: [id number]
                                                         LOCATION:[facility]    [facility]
                                                         ADMISSION DATE: 07/14/19
 
PROGRESS NOTE
 
 
DATE OF SERVICE:  07/22/2019
 
SUBJECTIVE:  The patient's case was discussed with staff.  She has no new
complaint.
 
OBJECTIVE:  The patient denies intent to harm herself or others.  She is
tolerating her medicines well.  Eye contact is fair.
 
ASSESSMENT:  No change in diagnoses.
 
PLAN:  Supportive and educational interventions were made.  Long-term prognosis
is guarded.
 
TRANSINT:[id number] Voice Confirmation ID: [id number] DOCUMENT ID: [id number]
 
 
 
 
                                           
                                           SILVIANO WINN MD             
 
 
 
Electronically Signed by SILVIANO WINN on 07/23/19 at 1450
 
 
 
 
 
 
 
 
 
 
 
 
 
 
 
 
 
 
 
 
CC:                                                             [id number]
DICTATION DATE: 07/22/19 1610     :     07/22/19 1658      ADM IN  
                                                                              
[facility]                                          
[address] [place] [address]

## 2019-07-23 NOTE — NUR
RECEIVED IN DAYROOM. SITTING IN A RECLINING CHAIR. CALM AND COOPERATIVE WITH
CARE AND ASSESSMENT. NO SIGNS OF AGGRESSSION. REDIRECT AND REORIENT AS NEEDED.
RESTING IN BED WITH EYES CLOSED. CONTINUE PLAN OF CARE

## 2019-07-23 NOTE — NUR
RECEIVED IN DAYROOM. SITTING QUIETLY IN A RECLINER AT TABLE. CALM AND
COOPERATIVE WITH CARE AND ASSESSMENT. NO SIGNS OF AGGRESSION. REDIRECT AND
REORIENT AS NEEDED. CONTINUES TO SIT QUIETLY IN RECLINER. CONTINUE PLAN OF
CARE

## 2019-07-23 NOTE — NUR
B) PATIENT IS AWAKE AND ALERT TO PERSON AND PLACE.  CALM AND COOPERATIVE
WITH CARE AND ASSESSMENT.
I) ADMINISTERED PRESCRIBED MEDICATIONS AS ORDERED.
R) COMPLIANT WITH MEDICATIONS.  REDIRECT AND REORIENT AS NEEDED.
P) WILL CONTINUE PLAN OF CARE.

## 2019-07-24 VITALS — SYSTOLIC BLOOD PRESSURE: 87 MMHG | DIASTOLIC BLOOD PRESSURE: 64 MMHG

## 2019-07-24 NOTE — NUR
PT IS AWAKE AND ALERT. CALM AND COOPERATIVE WITH ASSESSMENT. MED COMPLIANT. NO
AGGRESSION NOTED. REDIRECT AND REORIENT AS NEEDED. FALL PRECAUTIONS IN PLACE.
WILL CPOC.

## 2019-07-24 NOTE — PN
PATIENT:ROSALIE HERNANDEZ                            MEDICAL RECORD: [id number]
                                                         LOCATION:[facility]    [facility]
                                                         ADMISSION DATE: 07/14/19
 
PROGRESS NOTE
 
 
DATE OF SERVICE:  07/23/2019
 
SUBJECTIVE:  The patient's case was discussed with staff.  She has no new
complaint.
 
OBJECTIVE:  The patient is in good behavioral control with limited insight about
her condition.  She is tolerating her medicines well.
 
ASSESSMENT:  No change in diagnoses.
 
PLAN:  The patient has shown improvement and I anticipate she can be discharged
soon if this level of improvement continues.
 
TRANSINT:[id number] Voice Confirmation ID: [id number] DOCUMENT ID: [id number]
 
 
 
 
                                           
                                           SILVIANO WINN MD             
 
 
 
Electronically Signed by SILVIANO WINN on 07/24/19 at 1641
 
 
 
 
 
 
 
 
 
 
 
 
 
 
 
 
 
 
 
 
CC:                                                             [id number]
DICTATION DATE: 07/23/19 1527     :     07/23/19 1943      ADM IN  
                                                                              
[facility]                                          
[address] [place] [address]

## 2019-07-24 NOTE — NUR
B.) Patient is alert and oriented to self, place and situation. She is
pleasant with staff and makes needs known.
I.) Provided PM medications
R.) Compliant with all medications.
P.) Continue Plan of Care

## 2019-07-25 VITALS — SYSTOLIC BLOOD PRESSURE: 151 MMHG | DIASTOLIC BLOOD PRESSURE: 68 MMHG

## 2019-07-25 VITALS — DIASTOLIC BLOOD PRESSURE: 54 MMHG | SYSTOLIC BLOOD PRESSURE: 103 MMHG

## 2019-07-25 PROCEDURE — 0JBQ0ZZ EXCISION OF RIGHT FOOT SUBCUTANEOUS TISSUE AND FASCIA, OPEN APPROACH: ICD-10-PCS | Performed by: PODIATRIST

## 2019-07-25 NOTE — NUR
B.) PATIENT IS ALERT AND ORIENTED TO SELF AND SITUATION. SHE IS SARCASTIC WITH
STAFF. SHE IS NON-AMBULATORY AND IS IN HER CINDI-CHAIR.
I.) PROVIDED PM MEDICATIONS.
R.) COMPLIANT WITH ALL MEDICATIONS.
P.) CONTINUE PLAN OF CARE

## 2019-07-25 NOTE — NUR
Nutrition Team treatment review:
Diet:  ADA consistent CHO
PO Intake 99% average of last 9 meals
Labs reviewed
Wt: 179#
+BM
PO intake remains good at this time
RDN following.

## 2019-07-25 NOTE — NUR
RECEIVED PATIENT IN DINING ROOM FOR B'FAST, ALERT, CALM, COOPERATIVE, QUIET,
DEMANDING. MEDS ADMIN PER ORDERS WITH COMPLETE MED COMPLIANCE NOTED.
COOPERATIVE WITH PLAN OF CARE.  CONT POC AS DIRECTED.

## 2019-07-25 NOTE — PN
PATIENT:ROSALIE HERNANDEZ                            MEDICAL RECORD: [id number]
                                                         LOCATION:[facility]
                                                         ADMISSION DATE: 07/14/19
 
PROGRESS NOTE
 
 
DATE OF SERVICE:  07/24/2019
 
SUBJECTIVE:  The patient's case was discussed with staff.  She has no new
complaint.
 
OBJECTIVE:  The patient denies intent to harm herself or others.  She is
tolerating her medicines well.
 
ASSESSMENT:  No change in diagnoses.
 
PLAN:  Brief supportive and educational interventions were made.  Long-term
prognosis is guarded.
 
TRANSINT:[id number] Voice Confirmation ID: [id number] DOCUMENT ID: [id number]
 
 
 
 
                                           
                                           SILVIANO WINN MD             
 
 
 
Electronically Signed by SILVIANO WINN on 07/25/19 at 1543
 
 
 
 
 
 
 
 
 
 
 
 
 
 
 
 
 
 
 
 
CC:                                                             [id number]
DICTATION DATE: 07/24/19 1707     :     07/24/19 1938      ADM IN  
                                                                              
[facility]                                          
[address] [place] [address]

## 2019-07-26 VITALS — DIASTOLIC BLOOD PRESSURE: 76 MMHG | SYSTOLIC BLOOD PRESSURE: 154 MMHG

## 2019-07-26 VITALS — SYSTOLIC BLOOD PRESSURE: 104 MMHG | DIASTOLIC BLOOD PRESSURE: 49 MMHG

## 2019-07-26 NOTE — PN
PATIENT:ROSALIE HERNANDEZ                            MEDICAL RECORD: [id number]
                                                         LOCATION[facility]
                                                         ADMISSION DATE: 07/14/19
 
PROGRESS NOTE
 
 
DATE OF SERVICE:  07/25/2019
 
SUBJECTIVE:  The patient's case was discussed with staff.  She has no new
complaint.
 
OBJECTIVE:  The patient is in good behavioral control with limited insight about
her condition.  She does tolerate her medicines well.
 
ASSESSMENT:  No change in diagnoses.
 
PLAN:  The patient is still showing depressive symptoms.  She has not been
aggressive.  She is fairly limited in her insight about her situation.
 
TRANSINT:[id number] Voice Confirmation ID: [id number] DOCUMENT ID: [id number]
 
 
 
 
                                           
                                           SILVIANO WINN MD             
 
 
 
Electronically Signed by SILVIANO WINN on 07/26/19 at 1159
 
 
 
 
 
 
 
 
 
 
 
 
 
 
 
 
 
 
 
 
CC:                                                             [id number]
DICTATION DATE: 07/25/19 1553     :     07/25/19 1921      ADM IN  
                                                                              
[facility]                                          
[address] [place] [address]

## 2019-07-26 NOTE — NUR
RECEIVED PATIENT IN DINING ROOM FOR B'FAST, ALERT, CALM, COOPERATIVE.  MEDS
ADMIN PER ORDERS WITH COMPLETE MED COMPLIANCE NOTED.  PT IS A NON-COMPLIANT
DIABETIC, REFUSING TO ADHERE TO ADA DIET.  CONT POC AS DIRECTED.

## 2019-07-26 NOTE — NUR
PATIENT IS CONFUSED, CAN MAKE NEEDS KNOWN, COMPLIANT WITH MEDS, NEEDS
ASSISTANCE WITH ADL'S, WILL FOLLOW POC

## 2019-07-27 VITALS — SYSTOLIC BLOOD PRESSURE: 144 MMHG | DIASTOLIC BLOOD PRESSURE: 69 MMHG

## 2019-07-27 VITALS — DIASTOLIC BLOOD PRESSURE: 86 MMHG | SYSTOLIC BLOOD PRESSURE: 173 MMHG

## 2019-07-27 NOTE — NUR
B) The patient is awake and alert, she was able to stand and pivot with two
staff assist. She is talkative and bossy like her per usual and she is
laughing and joking with staff. I) Provide prescribed meds. R) The patient is
compliant with meds and unit milieu. P) Continue POC.

## 2019-07-27 NOTE — PN
PATIENT:ROSALIE HERNANDEZ                            MEDICAL RECORD: [id number]
                                                         LOCATION:[facility]
                                                         ADMISSION DATE: 07/14/19
 
PROGRESS NOTE
 
 
DATE OF SERVICE:  07/26/2019
 
SUBJECTIVE:  The patient's case was discussed with staff.  She has no new
complaint.
 
OBJECTIVE:  The patient is in good behavioral control with limited insight about
her condition.  She generally tolerates her medicines well.
 
ASSESSMENT:  No change in diagnoses.
 
PLAN:  The patient is having her Ativan tapered.  I am going to leave it at this
dose today and over the weekend, we will taper it a little further if I do not
have any problems with her adjusting to that.
 
TRANSINT:[id number] Voice Confirmation ID: [id number] DOCUMENT ID: [id number]
 
 
 
 
                                           
                                           SILVIANO WINN MD             
 
 
 
Electronically Signed by SILVIANO WINN on 07/27/19 at 1159
 
 
 
 
 
 
 
 
 
 
 
 
 
 
 
 
 
 
 
CC:                                                             [id number]
DICTATION DATE: 07/26/19 1206     :     07/26/19 1317      ADM IN  
                                                                              
[id number] [place] [address]

## 2019-07-27 NOTE — NUR
PATIENT IS CONFUSED, EXTREMELY NEEDY, PATIENT DOES NOT TRY TO DO ANYTHING FOR
HERSELF. DOES NOT COMMUMICATE WITH OTHER PEERS. COMPLIANT WITH MEDS.

## 2019-07-28 VITALS — DIASTOLIC BLOOD PRESSURE: 60 MMHG | SYSTOLIC BLOOD PRESSURE: 118 MMHG

## 2019-07-28 VITALS — SYSTOLIC BLOOD PRESSURE: 188 MMHG | DIASTOLIC BLOOD PRESSURE: 78 MMHG

## 2019-07-28 NOTE — NUR
PATIENT IS ALERT AND AWAKE. PT IS EASILY ANNOYED WITH OTHER PATIENTS AND
STAFF. PT CAN BE INPATIENT AND BOSSY AT TIMES. PT IS MED COMPLIANT. PT IS IN
CHAIR WITH CHAIR ALARM AND ACTIVE. WILL CONT PLAN OF CARE.

## 2019-07-28 NOTE — NUR
RECEIVED  IN DAYROOM. SITTING IN A RECLINER WITH PEERS BY HER SIDE. CALM AND
COOPERATIVE WITH CARE AND ASSESSMENT. NO SIGNS OF AGGRESSION. REDIRECT AND
REORIENT AS NEEDED. RESTING QUIETLY IN BED WITH EYES CLOSED AT THIS TIME.
CONTINUE PLAN OF CARE

## 2019-07-28 NOTE — NUR
PATIENT EXPLAINED TO THIS NURSE THE REASON FOR REFUSAL OF PT WAS BECAUSE SHE
WOULD PREFER A "LIKE" FACE AMERICAN TO GIVE HER PT. THIS NURSE INQUIRED THE
MEANING TO A "LIKE" FACE AMERICAN AS IN A AMERICAN. PATIENT EXPLAINED SHE
WOULD HAVE  A AMERICAN SOLIDER IF PEOPLE THAT LOOKED LIKE THE PT WASNT
THE
ENEMY. THIS NURSE TOLD HER MONDAY THEY COULD GET ANOTHER PT THERAPIST. PATIENT
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 WHY WOULD I WANT ONE?" WILL
REPORT TO ONCOMING SHIFT PT REQUEST.

## 2019-07-28 NOTE — PN
PATIENT:ROSALIE HERNANDEZ                            MEDICAL RECORD: [id number]
                                                         LOCATION[facility]
                                                         ADMISSION DATE: 07/14/19
 
PROGRESS NOTE
 
 
DATE OF SERVICE:  07/26/2019
 
SUBJECTIVE:  The patient's case was discussed with staff.  She has no new
complaint.
 
OBJECTIVE:  The patient is in good behavioral control with limited insight about
her condition.  She has no thoughts of harming herself or others and seems to
have significantly improved from that standpoint.
 
ASSESSMENT:  No change in diagnoses.
 
PLAN:  I am going to taper the patient's Ativan a little further today. 
Hopefully, there will not be any adverse effects such as a return of agitation
or aggressive behavior.  However, I will monitor her for those symptoms, and at
this point, I am encouraged about her improvement and do anticipate that she can
be reasonably transitioned out of the hospital soon.
 
TRANSINT:[id number] Voice Confirmation ID: [id number] DOCUMENT ID: [id number]
 
 
 
 
                                           
                                           SILVIANO WINN MD             
 
 
 
Electronically Signed by SILVIANO WINN on 07/28/19 at 1220
 
 
 
 
 
 
 
 
 
 
 
 
 
 
 
 
CC:                                                             [id number]
DICTATION DATE: 07/27/19 1230     :     07/27/19 2316      ADM IN  
                                                                              
[facility]                                          
[address] [place] [address]

## 2019-07-29 VITALS — SYSTOLIC BLOOD PRESSURE: 145 MMHG | DIASTOLIC BLOOD PRESSURE: 72 MMHG

## 2019-07-29 VITALS — DIASTOLIC BLOOD PRESSURE: 81 MMHG | SYSTOLIC BLOOD PRESSURE: 164 MMHG

## 2019-07-29 NOTE — NUR
PATIENT AWAKE AND ALERT.  CALM  AND COOPERATIVE WITH CARE AND ASSESSSMENT.
NO AGGRESSION NOTED.  MEDICATION COMPLIANT.  FALL PRECAUTIONS IN PLACE.
CONTINUE PLAN OF CARE.

## 2019-07-29 NOTE — NUR
RECEIVED IN DAYROOM. RESTING IN A RECLINER. SOCIALIZING WITH STAFF AND PEERS
AT TIMES. CALM AND COOPERATIVE WITH CARE AND ASSESSMENT. NO SIGNS OF
AGGRESSION. ENCOURAGE TO EXPRESS NEEDS. CONTINUES TO QUIETLY. CONTINUE PLAN
OFCARE

## 2019-07-29 NOTE — PN
PATIENT:ROSALIE HERNANDEZ                            MEDICAL RECORD: [id number]
                                                         LOCATION:[facility]
                                                         ADMISSION DATE: 07/14/19
 
PROGRESS NOTE
 
 
DATE OF SERVICE:  07/28/2019
 
OBJECTIVE:  The patient slept and ate well yesterday.  Her mood is much less
depressed.  Unfortunately, her cognitive skills have not significantly improved.
 
ASSESSMENT:  No change in diagnoses.
 
PLAN:  I anticipate the patient can be transitioned out of the hospital soon.  I
am aware that the nursing home may not take her back, but I am not sure if that
is a final decision yet.
 
TRANSINT:[id number] Voice Confirmation ID: [id number] DOCUMENT ID: [id number]
 
 
 
 
                                           
                                           SILVIANO WINN MD             
 
 
 
Electronically Signed by SILVIANO WINN on 07/29/19 at 1445
 
 
 
 
 
 
 
 
 
 
 
 
 
 
 
 
 
 
 
 
 
 
CC:                                                             [id number]
DICTATION DATE: 07/28/19 1226     :     07/28/19 1405      ADM IN  
                                                                              
[address] [place] [address]

## 2019-07-30 VITALS — SYSTOLIC BLOOD PRESSURE: 160 MMHG | DIASTOLIC BLOOD PRESSURE: 92 MMHG

## 2019-07-30 VITALS — SYSTOLIC BLOOD PRESSURE: 132 MMHG | DIASTOLIC BLOOD PRESSURE: 62 MMHG

## 2019-07-30 NOTE — NUR
RECEIVED PATIENT IN DINING ROOM FOR B'FAST, ALERT, CALM, COOPERATIVE.
SARCASTIC MOOD.  APPETITE GOOD.  MEDS ADMIN PER ORDERS WITH COMPLETE MED
COMPLIANCE NOTED.COOPERATIVE WITH PLAN OF CARE.  CON POC INCLUDING MEDS AND
GROUP THERAPY AS DIRECTED.

## 2019-07-30 NOTE — NUR
RECEIVED IN DAYROOM. SITTING IN A RECLINING CHAIR AT THE TABLE. CALM AND
COOPERATIVE WITH CARE AND ASSESSMENT. NO SIGNS OF AGGRESSION. REDIRECT AND
REORIENT AS NEEDED. CONTINUES TO SIT QUIETLY. CONTINUE PLAN OF CARE

## 2019-07-30 NOTE — PN
PATIENT:ROSALIE HERNANDEZ                            MEDICAL RECORD: [id number]
                                                         LOCATION:[facility]
                                                         ADMISSION DATE: 07/14/19
 
PROGRESS NOTE
 
 
DATE OF SERVICE:  07/29/2019
 
SUBJECTIVE:  The patient's case was discussed with staff.  She has no new
complaint.
 
OBJECTIVE:  The patient denies intent to harm herself or others.  She is in good
behavioral control.
 
ASSESSMENT:  No change in diagnoses.
 
PLAN:  I anticipate the patient can be transitioned out of the hospital soon. 
At this point, we are waiting for approval from the office of long-term care.  I
believe the [facility] is going to accept her if the state
office approves.
 
TRANSINT:[id number] Voice Confirmation ID: [id number] DOCUMENT ID: [id number]
 
 
 
 
                                           
                                           SILVIANO WINN MD             
 
 
 
Electronically Signed by SILVIANO WINN on 07/30/19 at 1114
 
 
 
 
 
 
 
 
 
 
 
 
 
 
 
 
 
 
CC:                                                             [id number]
DICTATION DATE: 07/29/19 1534     :     07/29/19 1712      ADM IN  
                                                                              
[id number] [place] [address]

## 2019-07-31 VITALS — SYSTOLIC BLOOD PRESSURE: 99 MMHG | DIASTOLIC BLOOD PRESSURE: 54 MMHG

## 2019-07-31 NOTE — PN
PATIENT:ROSALIE HERNANDEZ                            MEDICAL RECORD: [id number]
                                                         LOCATION:[facility]
                                                         ADMISSION DATE: 07/14/19
 
PROGRESS NOTE
 
 
DATE OF SERVICE:  07/30/2019
 
SUBJECTIVE:  The patient's case was discussed with staff.  She has no new
complaint.
 
OBJECTIVE:  The patient is in good behavioral control with limited insight about
her condition.  She is tolerating her medicines well.
 
ASSESSMENT:  No change in diagnoses.
 
PLAN:  The patient is ready for discharge as soon as the paperwork from the
office of long-term care is available.  At this point, I believe she is in
reasonable behavioral control and not representing an acute risk to others.
 
TRANSINT:[id number] Voice Confirmation ID: [id number] DOCUMENT ID: [id number]
 
 
 
 
                                           
                                           SILVIANO WINN MD             
 
 
 
Electronically Signed by SILVIANO IWNN on 07/31/19 at 1532
 
 
 
 
 
 
 
 
 
 
 
 
 
 
 
 
 
 
 
CC:                                                             [id number]
DICTATION DATE: 07/30/19 1138     :     07/30/19 1156      ADM IN  
                                                                              
[id number] [place] [address]

## 2019-07-31 NOTE — NUR
PATIENT IS CONFUSED, NEEDY, DEMANDING AT TIMES, CAN MAKE NEEDS KNOWN,
COMPLIANT WITH MEDS. WILL FOLLOW POC

## 2019-07-31 NOTE — NUR
RECEIVED PT IN DINING ROOM AT BREAKFAST, ALERT, CALM, COOPERATIVE, SARCASTIC,
NO AGGRESSION NOTED.  ALTACE HELD DUE TO B/P 99/54, OTHER MEDS ADMIN PER
ORDERS.  COOPERATIVE WITH POC.  CONT PLAN OF CARE AS DIRECTED. DRESSING
CHANGED TO BLE.

## 2019-08-01 VITALS — SYSTOLIC BLOOD PRESSURE: 164 MMHG | DIASTOLIC BLOOD PRESSURE: 84 MMHG

## 2019-08-01 VITALS — SYSTOLIC BLOOD PRESSURE: 180 MMHG | DIASTOLIC BLOOD PRESSURE: 84 MMHG

## 2019-08-01 NOTE — NUR
CONFUSED AND DISORIENTED.COMPLIANT WITH STAFF AND MEDS.IS INCONTINENT OF
BOWELS AND URINE AT TIMES.WILL CONTINUE WITH CURRENT PLAN OF CARE,MONITOR FOR
CHANGES AND SAFETY.

## 2019-08-01 NOTE — PN
PATIENT:ROSALIE HERNANDEZ                            MEDICAL RECORD: [id number]
                                                         LOCATION:[facility]
                                                         ADMISSION DATE: 07/14/19
 
PROGRESS NOTE
 
 
DATE OF SERVICE:  07/31/2019
 
SUBJECTIVE:  The patient's case was discussed with staff.  She has no new
complaint.
 
OBJECTIVE:  The patient is in good behavioral control, has no thoughts of
harming herself or others and is tolerating her medicines well.
 
ASSESSMENT:
1.  Major depression.
2.  Alzheimer disease.
 
PLAN:  At this time, the patient is improving and is at or close to the point
where she will be ready to be discharged.  Unfortunately, there seems to be some
problems with her finances and this may be an impediment to getting her
discharged soon.  I hope that is not the case, but it is not looking good. 
Additionally, the patient cannot live independently.  She does not have any
friends or family that are capable of caring for her and the least restrictive
environment is a nursing home.
 
TRANSINT:[id number] Voice Confirmation ID: [id number] DOCUMENT ID: [id number]
 
 
 
 
                                           
                                           SILVIANO WINN MD             
 
 
 
Electronically Signed by SILVIANO WINN on 08/01/19 at 1121
 
 
 
 
 
 
 
 
 
 
 
 
 
CC:                                                             [id number]
DICTATION DATE: 07/31/19 1653     :     07/31/19 2254      ADM IN  
                                                                              
[id number] [place] [address]

## 2019-08-01 NOTE — NUR
Team Treatment Review:
Diet: Diabetic Diet
PO Intake: 100% per 9 meals
Wt: 175 lbs (-3 lbs since 7/21)
BM: X 2 on 7/31
Meds: Lantus, Humulin, Vit D
-Will monitor closely
-Will Provide supplements if PO <50%
-Clinical Dietitian Following

## 2019-08-02 VITALS — SYSTOLIC BLOOD PRESSURE: 170 MMHG | DIASTOLIC BLOOD PRESSURE: 76 MMHG

## 2019-08-02 VITALS — SYSTOLIC BLOOD PRESSURE: 171 MMHG | DIASTOLIC BLOOD PRESSURE: 78 MMHG

## 2019-08-02 NOTE — NUR
PATIENT SITITNG IN CHAIR WITH EYES CLOSED. RESP EVEN AND NONLABORED. NO ACUTE
DISTRESS NOTED. PT COMPLIANT WITH MEDS, ASSESSMENT AND VITAL SIGNS. PT IS
CONFUSED AND DEMANDING WITH STAFF. PT DOES REQUIRE SIT TO STAND LIFT DUE TO
WEAKNESS IN THE LEGS. CHAIR ALARM IN PLACE AND ACTIVE.

## 2019-08-02 NOTE — PN
PATIENT:ROSALIE HERNANDEZ                            MEDICAL RECORD: [id number]
                                                         [facility]
                                                         ADMISSION DATE: 07/14/19
 
PROGRESS NOTE
 
 
DATE OF SERVICE:  08/01/2019
 
SUBJECTIVE:  The patient's case was discussed with staff.  She has no new
complaint.  She slept and ate well.  There are some issues with her discharge. 
Apparently, they are financial and it is problematic in getting her placed.
 
ASSESSMENT:  No change in diagnoses.
 
PLAN:  The patient can be transitioned out of the hospital as soon as these
issues are arranged.
 
TRANSINT:[id number] Voice Confirmation ID: [id number] DOCUMENT ID: [id number]
 
 
 
 
                                           
                                           SILVIANO WINN MD             
 
 
 
Electronically Signed by SILVIANO WINN on 08/02/19 at 1514
 
 
 
 
 
 
 
 
 
 
 
 
 
 
 
 
 
 
 
 
 
 
CC:                                                             [id number]
DICTATION DATE: 08/01/19 1303     :     08/01/19 1317      ADM IN  
                                                                              
[id number] [place] [address]

## 2019-08-03 VITALS — DIASTOLIC BLOOD PRESSURE: 80 MMHG | SYSTOLIC BLOOD PRESSURE: 169 MMHG

## 2019-08-03 VITALS — DIASTOLIC BLOOD PRESSURE: 76 MMHG | SYSTOLIC BLOOD PRESSURE: 146 MMHG

## 2019-08-03 NOTE — NUR
IS ORIENTED TO SELF AND HOSPITAL.COMPLIANT WITH STAFF AND MEDS BUT SARCASTIC
AT TIMES.ALSO DEMANDING AT TIMES BUT HAVE NOTICED HER SAYING THANK YOU THIS
AFTERNOON.REQUIRES SIT TO STAND FOR TRANSFERS DUE TO WEAKNESS.WILL CONTINUE
WITH PLAN OF CARE,MONITOR FOR SAFETY AND CHANGES.

## 2019-08-03 NOTE — NUR
B) Patient is alert and oriented to person and place, fussy and needy at
times,
I) Administered scheduled medications as ordered, monitored for needs and
wants,
R) Mediation compliant, sleeping quietly in her bed,
P) Continue plan of care.

## 2019-08-03 NOTE — PN
PATIENT:ROSALIE HERNANDEZ                            MEDICAL RECORD: [id number]
                                                         [facility]
                                                         ADMISSION DATE: 07/14/19
 
PROGRESS NOTE
 
 
DATE OF SERVICE:  08/02/2019
 
SUBJECTIVE:  The patient's case was discussed with staff.  She has no new
complaint.
 
OBJECTIVE:  The patient is in good behavioral control.  She has a near euthymic
mood.
 
ASSESSMENT:  No change in diagnoses.
 
PLAN:  The discharge planner  tells me that there has been a
development in her discharge and that some of the difficulties that were present
a couple of days ago may well resolve and I may be able to discharge her early
next week assuming this level of improvement continues.
 
TRANSINT:[id number] Voice Confirmation ID: [id number] DOCUMENT ID: [id number]
 
 
 
 
                                           
                                           SILVIANO WINN MD             
 
 
 
Electronically Signed by SILVIANO WINN on 08/03/19 at 1116
 
 
 
 
 
 
 
 
 
 
 
 
 
 
 
 
 
 
CC:                                                             [id number]
DICTATION DATE: 08/02/19 1523     :     08/02/19 1540      ADM IN  
                                                                              
[id number] [place] [address]

## 2019-08-04 VITALS — DIASTOLIC BLOOD PRESSURE: 84 MMHG | SYSTOLIC BLOOD PRESSURE: 164 MMHG

## 2019-08-04 NOTE — NUR
B.) PATIENT IS PLEASANT. RESTING COMFORTABLY IN CINDI-CHAIR IN DAYROOM. MAKES
NEEDS KNOWN. PT ASSUMES SHE IS D/C TOMMORROW MORNING.
I.) PROVIDED PM MEDICATION
R.) COMPLIANT WITH ALL MEDICATIONS.
P.) CONTINUE PLAN OF CARE.

## 2019-08-04 NOTE — NUR
RECEIVED PATIENT IN DINING ROOM FOR B'FAST, ALERT, CALM, COOPERATIVE,
PLEASANT.  MEDS ADMIN PER ORDERS WITH COMPLETE MED COMPLIANCE NOTED. NO
AGGRESSION OR SUICIDAL IDEATIONS NOTED.  STATED THAT SHE WOULD BE DISCHARGING
TOMORROW.  COOPERATIVE WITH PLAN OF CARE.

## 2019-08-04 NOTE — NUR
B) Patient is alert and oriented to person, place and time, calm and
cooperative this shift,
I) Administered scheduled medications as ordered, assisted with needs, ,
monitored FSBS.
R) Mediation compliant, pleasant and friendly toward staff,
P) Continue plan of care.

## 2019-08-05 VITALS — SYSTOLIC BLOOD PRESSURE: 137 MMHG | DIASTOLIC BLOOD PRESSURE: 77 MMHG

## 2019-08-05 NOTE — NUR
LEFT AND RIGHT FEET DRESSINGS CHANGED WITH WET TO DRY BETADINE GAUGE DRESSING.
LEFT 3RD TOE 5 SUTURES REMOVED PRIOR TO DISCHARGE.  REWRAPPED AND SOCKS PLACED
OVER.

## 2019-08-06 NOTE — PN
PATIENT:ROSALIE HERNANDEZ                            MEDICAL RECORD: [id number]
                                                         [facility]
                                                         ADMISSION DATE: 07/14/19
 
PROGRESS NOTE
 
 
DATE OF SERVICE:  08/04/2019
 
SUBJECTIVE:  The patient's case was discussed with staff.  She has no new
complaint.
 
OBJECTIVE:  The patient is cooperative and fully oriented today.  Despite the
orientation, she is demented.  She will be maintained on current medicines and
she has been approved for placement in the [facility].  I
anticipate she can be transitioned out of the hospital tomorrow.
 
TRANSINT:[id number] Voice Confirmation ID: [id number] DOCUMENT ID: [id number]
 
 
 
 
                                           
                                           SILVIANO WINN MD             
 
 
 
Electronically Signed by SILVIANO WINN on 08/06/19 at 1405
 
 
 
 
 
 
 
 
 
 
 
 
 
 
 
 
 
 
 
 
 
 
 
CC:                                                             [id number]
DICTATION DATE: 08/04/19 1352     :     08/04/19 1746      DIS IN  
                                                                      08/05/19
[id number] [place] [address]

## 2019-08-06 NOTE — DS
PATIENT:ROSALIE HERNANDEZ                    :49   MEDICAL RECORD: [id number]
 
                              DISCHARGE SUMMARY
                                                         
ADMISSION DATE:    19                       DISCHARGE DATE:     19
 
 
IDENTIFYING DATA:  The patient is 74 years old and she was admitted to the
hospital on a voluntary basis secondary to aggression.  The patient lives in a
local nursing home.  She apparently got up and went to another patient's bed
where she urinated.  She said she did this because the staff would not take her
to the bathroom.  She also took the leg off a wheelchair and was swinging it
about in a threatening aggressive way.  She was clearly quite confused, angry
and agitated upon admission.
 
HOSPITAL COURSE:  The patient was admitted to the hospital and fully evaluated
from both a medical, psychological, and social standpoint.  She endorsed
numerous neurovegetative depressive symptoms largely related to her decline
physically, mentally and her loss of independence and dignity.  She clearly had
evidence of significant memory impairment.  The patient is known to me from
previous clinical contact and in addition to the mood disorder and the dementia,
she has a very strong lifelong symptoms consistent with cluster B personality. 
She was treated with antidepressant, memory enhancing medication.  She showed
dramatic improvement in her mood and actually showed some improvement in her
underlying cognition indicating that the mood symptoms were affecting her
ability to think clearly.  She was quite demented and impaired and still in need
of 24-hour-a-day supervision, but there was an improvement in her underlying
memory.  She was subsequently transitioned out of the hospital and back to a
nursing home in this case a different nursing home.  She was optimistic about
going, felt that she was going to get a fresh start in a new environment and
certainly had no evidence of direct or eminent danger to herself or others upon
discharge.
 
DISCHARGE DIAGNOSES:
AXIS I:
1.  Major depression, moderate severity, recurrent without psychotic features.
2.  Senile dementia of the Alzheimer's type.
AXIS II:  Cluster B personality traits.
AXIS III:  Hypertension, rheumatoid arthritis, and peripheral neuropathy.
AXIS IV:  Moderate.
AXIS V:  Global assessment of functioning is 35.
 
PLAN:  At the time of discharge, the patient was in good behavioral control with
limited insight about her condition.  She was tolerating her medicines well.
 
Followup is to be with the primary care nursing home physician.
 
TRANSINT:[id number] Voice Confirmation ID: [id number] DOCUMENT ID: [id number]
                                           
                                           SILVIANO WINN MD             
 
 
 
Electronically Signed by SILVIANO WINN on 19 at 1405
CC:                                                             [id number]
DICTATION DATE: 19 1423     :     19 0429      DIS IN  
                                                                      19
[facility]                                          
[address] [place] [address]